# Patient Record
Sex: FEMALE | Race: WHITE | NOT HISPANIC OR LATINO
[De-identification: names, ages, dates, MRNs, and addresses within clinical notes are randomized per-mention and may not be internally consistent; named-entity substitution may affect disease eponyms.]

---

## 2017-07-08 ENCOUNTER — RX RENEWAL (OUTPATIENT)
Age: 57
End: 2017-07-08

## 2017-09-22 ENCOUNTER — APPOINTMENT (OUTPATIENT)
Dept: FAMILY MEDICINE | Facility: CLINIC | Age: 57
End: 2017-09-22
Payer: COMMERCIAL

## 2017-09-22 VITALS
WEIGHT: 179 LBS | HEIGHT: 66 IN | SYSTOLIC BLOOD PRESSURE: 128 MMHG | HEART RATE: 91 BPM | TEMPERATURE: 98.9 F | BODY MASS INDEX: 28.77 KG/M2 | OXYGEN SATURATION: 98 % | RESPIRATION RATE: 12 BRPM | DIASTOLIC BLOOD PRESSURE: 72 MMHG

## 2017-09-22 PROCEDURE — 99214 OFFICE O/P EST MOD 30 MIN: CPT

## 2018-04-02 ENCOUNTER — RX RENEWAL (OUTPATIENT)
Age: 58
End: 2018-04-02

## 2018-04-26 ENCOUNTER — APPOINTMENT (OUTPATIENT)
Dept: FAMILY MEDICINE | Facility: CLINIC | Age: 58
End: 2018-04-26
Payer: COMMERCIAL

## 2018-04-26 VITALS
HEART RATE: 88 BPM | DIASTOLIC BLOOD PRESSURE: 70 MMHG | RESPIRATION RATE: 12 BRPM | SYSTOLIC BLOOD PRESSURE: 120 MMHG | TEMPERATURE: 98.9 F | OXYGEN SATURATION: 98 % | HEIGHT: 66 IN | WEIGHT: 184 LBS | BODY MASS INDEX: 29.57 KG/M2

## 2018-04-26 PROCEDURE — 99214 OFFICE O/P EST MOD 30 MIN: CPT

## 2018-06-19 ENCOUNTER — APPOINTMENT (OUTPATIENT)
Dept: FAMILY MEDICINE | Facility: CLINIC | Age: 58
End: 2018-06-19
Payer: COMMERCIAL

## 2018-06-19 VITALS
HEIGHT: 66 IN | DIASTOLIC BLOOD PRESSURE: 70 MMHG | BODY MASS INDEX: 29.89 KG/M2 | TEMPERATURE: 98.4 F | OXYGEN SATURATION: 98 % | WEIGHT: 186 LBS | SYSTOLIC BLOOD PRESSURE: 116 MMHG | RESPIRATION RATE: 13 BRPM | HEART RATE: 110 BPM

## 2018-06-19 PROCEDURE — 99214 OFFICE O/P EST MOD 30 MIN: CPT

## 2018-10-02 ENCOUNTER — APPOINTMENT (OUTPATIENT)
Dept: FAMILY MEDICINE | Facility: CLINIC | Age: 58
End: 2018-10-02
Payer: COMMERCIAL

## 2018-10-02 VITALS
TEMPERATURE: 99 F | WEIGHT: 180 LBS | RESPIRATION RATE: 13 BRPM | HEIGHT: 66 IN | OXYGEN SATURATION: 98 % | BODY MASS INDEX: 28.93 KG/M2 | DIASTOLIC BLOOD PRESSURE: 70 MMHG | HEART RATE: 94 BPM | SYSTOLIC BLOOD PRESSURE: 126 MMHG

## 2018-10-02 PROCEDURE — 99214 OFFICE O/P EST MOD 30 MIN: CPT

## 2018-10-02 NOTE — ASSESSMENT
[FreeTextEntry1] : Patient will start Cefzil 500 mg p.o. b.i.d. after food x10 days.\par Continue with Flonase AQ.\par Continue with saline nasal drops.\par Hot steam inhalations 3-4 times a day.\par Tylenol/Advil for headache.\par \par Followup 2 weeks for flu shot.

## 2018-10-02 NOTE — HISTORY OF PRESENT ILLNESS
[FreeTextEntry8] : 58-year-old white female here for an acute visit.\par Patient states she developed a cold last week which has since progressed to sinus congestion with bad headaches, productive cough with brownish phlegm. Patient denies any fever, GI symptoms or urinary symptoms.

## 2018-10-02 NOTE — REVIEW OF SYSTEMS
[Fever] : no fever [Chills] : no chills [Fatigue] : no fatigue [Discharge] : no discharge [Vision Problems] : no vision problems [Earache] : no earache [Nasal Discharge] : nasal discharge [Sore Throat] : no sore throat [Postnasal Drip] : postnasal drip [Chest Pain] : no chest pain [Palpitations] : no palpitations [Shortness Of Breath] : no shortness of breath [Wheezing] : no wheezing [Cough] : cough [Abdominal Pain] : no abdominal pain [Nausea] : no nausea [Diarrhea] : diarrhea [Vomiting] : no vomiting [Dysuria] : no dysuria [Hematuria] : no hematuria [Frequency] : no frequency [Joint Pain] : no joint pain [Muscle Pain] : no muscle pain [Headache] : headache [Dizziness] : no dizziness [Insomnia] : no insomnia [Anxiety] : no anxiety [Depression] : no depression [Swollen Glands] : no swollen glands [FreeTextEntry4] : sinus congestion

## 2018-10-02 NOTE — HEALTH RISK ASSESSMENT
[] : No [No falls in past year] : Patient reported no falls in the past year [0] : 2) Feeling down, depressed, or hopeless: Not at all (0) [[id number]] : 0

## 2018-10-02 NOTE — PHYSICAL EXAM
[No Acute Distress] : no acute distress [Well Nourished] : well nourished [Well Developed] : well developed [Well-Appearing] : well-appearing [Normal Sclera/Conjunctiva] : normal sclera/conjunctiva [PERRL] : pupils equal round and reactive to light [EOMI] : extraocular movements intact [Normal Outer Ear/Nose] : the outer ears and nose were normal in appearance [Normal Oropharynx] : the oropharynx was normal [Normal TMs] : both tympanic membranes were normal [No JVD] : no jugular venous distention [Supple] : supple [No Lymphadenopathy] : no lymphadenopathy [Thyroid Normal, No Nodules] : the thyroid was normal and there were no nodules present [No Respiratory Distress] : no respiratory distress  [Clear to Auscultation] : lungs were clear to auscultation bilaterally [No Accessory Muscle Use] : no accessory muscle use [Normal Rate] : normal rate  [Regular Rhythm] : with a regular rhythm [Normal S1, S2] : normal S1 and S2 [No Murmur] : no murmur heard [No Varicosities] : no varicosities [Pedal Pulses Present] : the pedal pulses are present [No Edema] : there was no peripheral edema [No Extremity Clubbing/Cyanosis] : no extremity clubbing/cyanosis [Soft] : abdomen soft [Non Tender] : non-tender [Non-distended] : non-distended [No Masses] : no abdominal mass palpated [No HSM] : no HSM [Normal Bowel Sounds] : normal bowel sounds [Normal Posterior Cervical Nodes] : no posterior cervical lymphadenopathy [Normal Anterior Cervical Nodes] : no anterior cervical lymphadenopathy [No CVA Tenderness] : no CVA  tenderness [No Spinal Tenderness] : no spinal tenderness [No Joint Swelling] : no joint swelling [Grossly Normal Strength/Tone] : grossly normal strength/tone [No Rash] : no rash [Normal Gait] : normal gait [Coordination Grossly Intact] : coordination grossly intact [No Focal Deficits] : no focal deficits [Deep Tendon Reflexes (DTR)] : deep tendon reflexes were 2+ and symmetric [Normal Affect] : the affect was normal [Normal Insight/Judgement] : insight and judgment were intact [de-identified] : tender frontal, ethmoid and maxillary sinuses bilaterally

## 2018-10-04 ENCOUNTER — RX RENEWAL (OUTPATIENT)
Age: 58
End: 2018-10-04

## 2018-10-25 ENCOUNTER — APPOINTMENT (OUTPATIENT)
Dept: ENDOCRINOLOGY | Facility: CLINIC | Age: 58
End: 2018-10-25

## 2018-11-06 LAB
HBA1C MFR BLD HPLC: 6
HBA1C MFR BLD: 6

## 2018-11-13 LAB
HBA1C MFR BLD HPLC: 6.2
HBA1C MFR BLD: 6.2
HBA1C MFR BLD: 6.2

## 2018-11-14 ENCOUNTER — RECORD ABSTRACTING (OUTPATIENT)
Age: 58
End: 2018-11-14

## 2018-12-12 ENCOUNTER — RECORD ABSTRACTING (OUTPATIENT)
Age: 58
End: 2018-12-12

## 2018-12-26 ENCOUNTER — RX RENEWAL (OUTPATIENT)
Age: 58
End: 2018-12-26

## 2019-01-10 ENCOUNTER — APPOINTMENT (OUTPATIENT)
Dept: ENDOCRINOLOGY | Facility: CLINIC | Age: 59
End: 2019-01-10
Payer: COMMERCIAL

## 2019-01-10 ENCOUNTER — RESULT CHARGE (OUTPATIENT)
Age: 59
End: 2019-01-10

## 2019-01-10 VITALS
HEART RATE: 99 BPM | HEIGHT: 66 IN | BODY MASS INDEX: 29.89 KG/M2 | DIASTOLIC BLOOD PRESSURE: 80 MMHG | SYSTOLIC BLOOD PRESSURE: 120 MMHG | WEIGHT: 186 LBS

## 2019-01-10 LAB — GLUCOSE BLDC GLUCOMTR-MCNC: 199

## 2019-01-10 PROCEDURE — 99214 OFFICE O/P EST MOD 30 MIN: CPT | Mod: 25

## 2019-01-10 PROCEDURE — 82962 GLUCOSE BLOOD TEST: CPT

## 2019-01-10 RX ORDER — CLARITHROMYCIN 500 MG/1
500 TABLET, FILM COATED ORAL TWICE DAILY
Qty: 14 | Refills: 0 | Status: DISCONTINUED | COMMUNITY
Start: 2017-09-22 | End: 2019-01-10

## 2019-01-10 RX ORDER — CLARITHROMYCIN 500 MG/1
500 TABLET, FILM COATED ORAL TWICE DAILY
Qty: 14 | Refills: 0 | Status: DISCONTINUED | COMMUNITY
Start: 2018-04-26 | End: 2019-01-10

## 2019-01-10 RX ORDER — CEFPROZIL 500 MG/1
500 TABLET ORAL
Qty: 20 | Refills: 0 | Status: DISCONTINUED | COMMUNITY
Start: 2018-10-02 | End: 2019-01-10

## 2019-01-18 ENCOUNTER — RX RENEWAL (OUTPATIENT)
Age: 59
End: 2019-01-18

## 2019-01-18 RX ORDER — LINAGLIPTIN 5 MG/1
5 TABLET, FILM COATED ORAL DAILY
Refills: 0 | Status: DISCONTINUED | COMMUNITY
Start: 1900-01-01 | End: 2019-01-18

## 2019-01-21 ENCOUNTER — RX RENEWAL (OUTPATIENT)
Age: 59
End: 2019-01-21

## 2019-02-07 ENCOUNTER — RX RENEWAL (OUTPATIENT)
Age: 59
End: 2019-02-07

## 2019-03-04 ENCOUNTER — APPOINTMENT (OUTPATIENT)
Dept: FAMILY MEDICINE | Facility: CLINIC | Age: 59
End: 2019-03-04
Payer: COMMERCIAL

## 2019-03-04 VITALS
BODY MASS INDEX: 29.89 KG/M2 | WEIGHT: 186 LBS | RESPIRATION RATE: 13 BRPM | TEMPERATURE: 98 F | HEIGHT: 66 IN | DIASTOLIC BLOOD PRESSURE: 78 MMHG | HEART RATE: 78 BPM | OXYGEN SATURATION: 98 % | SYSTOLIC BLOOD PRESSURE: 128 MMHG

## 2019-03-04 PROCEDURE — 99213 OFFICE O/P EST LOW 20 MIN: CPT

## 2019-03-04 NOTE — REVIEW OF SYSTEMS
[Fever] : no fever [Chills] : no chills [Fatigue] : no fatigue [Discharge] : no discharge [Vision Problems] : no vision problems [Earache] : no earache [Nasal Discharge] : no nasal discharge [Sore Throat] : no sore throat [Postnasal Drip] : no postnasal drip [Chest Pain] : no chest pain [Palpitations] : no palpitations [Shortness Of Breath] : no shortness of breath [Wheezing] : no wheezing [Cough] : no cough [Abdominal Pain] : no abdominal pain [Nausea] : no nausea [Diarrhea] : diarrhea [Vomiting] : no vomiting [Dysuria] : no dysuria [Hematuria] : no hematuria [Frequency] : no frequency [Joint Pain] : no joint pain [Muscle Pain] : no muscle pain [Headache] : no headache [Dizziness] : no dizziness [Insomnia] : no insomnia [Anxiety] : no anxiety [Depression] : no depression [Swollen Glands] : no swollen glands [FreeTextEntry9] : 2 inch horizontal indentation mid calf on both lower legs [de-identified] : dry skin both legs

## 2019-03-04 NOTE — PHYSICAL EXAM
[No Respiratory Distress] : no respiratory distress  [Clear to Auscultation] : lungs were clear to auscultation bilaterally [No Accessory Muscle Use] : no accessory muscle use [Normal Rate] : normal rate  [Regular Rhythm] : with a regular rhythm [Normal S1, S2] : normal S1 and S2 [No Varicosities] : no varicosities [Pedal Pulses Present] : the pedal pulses are present [No Edema] : there was no peripheral edema [No Joint Swelling] : no joint swelling [Grossly Normal Strength/Tone] : grossly normal strength/tone [Normal Gait] : normal gait [Coordination Grossly Intact] : coordination grossly intact [No Focal Deficits] : no focal deficits [Normal Affect] : the affect was normal [Normal Insight/Judgement] : insight and judgment were intact [de-identified] : dry skindermatitis on both lower legs. 2 inch indentationhorizontal on both mid calf. Nontender. No bruising or edema noted.

## 2019-03-04 NOTE — HISTORY OF PRESENT ILLNESS
[FreeTextEntry8] : 58-year-old white female here for an acute visit. Patient states she has noted in the middle of her lower leg horizontal 2 inch indentation bilaterally. Patient denies any history of trauma, denies wearing knee-highs or socks up to that level. No history of pain or ankle swelling. Patient does have dry skin on both legs.

## 2019-03-04 NOTE — ASSESSMENT
[FreeTextEntry1] : Dry skin dermatitis----patient to see dermatology for skin check. Apply moisturizing lotion.\par  Indentation of skin on both legs----up patient to see dermatology. No edema or pain elicited. If there is pain or swelling developing patient to return for ultrasound lower legs.\par

## 2019-03-04 NOTE — HEALTH RISK ASSESSMENT
[] : No [No falls in past year] : Patient reported no falls in the past year [0] : 2) Feeling down, depressed, or hopeless: Not at all (0) [de-identified] : Endo [[id number]] : 0

## 2019-03-26 ENCOUNTER — RX CHANGE (OUTPATIENT)
Age: 59
End: 2019-03-26

## 2019-05-23 ENCOUNTER — RESULT CHARGE (OUTPATIENT)
Age: 59
End: 2019-05-23

## 2019-05-23 ENCOUNTER — APPOINTMENT (OUTPATIENT)
Dept: ENDOCRINOLOGY | Facility: CLINIC | Age: 59
End: 2019-05-23
Payer: COMMERCIAL

## 2019-05-23 VITALS
DIASTOLIC BLOOD PRESSURE: 80 MMHG | HEART RATE: 88 BPM | HEIGHT: 66 IN | BODY MASS INDEX: 30.86 KG/M2 | SYSTOLIC BLOOD PRESSURE: 122 MMHG | WEIGHT: 192 LBS

## 2019-05-23 LAB — GLUCOSE BLDC GLUCOMTR-MCNC: 187

## 2019-05-23 PROCEDURE — 99214 OFFICE O/P EST MOD 30 MIN: CPT | Mod: 25

## 2019-05-23 PROCEDURE — 82962 GLUCOSE BLOOD TEST: CPT

## 2019-05-23 RX ORDER — OXYCODONE HYDROCHLORIDE 7.5 MG/1
7.5 TABLET ORAL
Refills: 0 | Status: DISCONTINUED | COMMUNITY
End: 2019-05-23

## 2019-06-19 ENCOUNTER — RX RENEWAL (OUTPATIENT)
Age: 59
End: 2019-06-19

## 2019-07-12 ENCOUNTER — RX RENEWAL (OUTPATIENT)
Age: 59
End: 2019-07-12

## 2019-07-15 ENCOUNTER — RX RENEWAL (OUTPATIENT)
Age: 59
End: 2019-07-15

## 2019-07-16 ENCOUNTER — RX RENEWAL (OUTPATIENT)
Age: 59
End: 2019-07-16

## 2019-09-11 ENCOUNTER — RX RENEWAL (OUTPATIENT)
Age: 59
End: 2019-09-11

## 2019-09-12 ENCOUNTER — APPOINTMENT (OUTPATIENT)
Dept: FAMILY MEDICINE | Facility: CLINIC | Age: 59
End: 2019-09-12
Payer: COMMERCIAL

## 2019-09-12 VITALS
DIASTOLIC BLOOD PRESSURE: 80 MMHG | RESPIRATION RATE: 12 BRPM | SYSTOLIC BLOOD PRESSURE: 120 MMHG | HEIGHT: 66 IN | HEART RATE: 100 BPM | WEIGHT: 192 LBS | TEMPERATURE: 98.2 F | OXYGEN SATURATION: 98 % | BODY MASS INDEX: 30.86 KG/M2

## 2019-09-12 PROCEDURE — 99214 OFFICE O/P EST MOD 30 MIN: CPT

## 2019-09-12 NOTE — HEALTH RISK ASSESSMENT
[] : No [No falls in past year] : Patient reported no falls in the past year [No] : In the past 12 months have you used drugs other than those required for medical reasons? No [0] : 2) Feeling down, depressed, or hopeless: Not at all (0) [Audit-CScore] : 0 [de-identified] : Endo [[id number]] : 0

## 2019-09-12 NOTE — PHYSICAL EXAM
[No Acute Distress] : no acute distress [Normal Sclera/Conjunctiva] : normal sclera/conjunctiva [PERRL] : pupils equal round and reactive to light [EOMI] : extraocular movements intact [Normal Outer Ear/Nose] : the outer ears and nose were normal in appearance [Normal Oropharynx] : the oropharynx was normal [Normal Nasal Mucosa] : the nasal mucosa was normal [Normal TMs] : both tympanic membranes were normal [Supple] : supple [No JVD] : no jugular venous distention [No Lymphadenopathy] : no lymphadenopathy [No Respiratory Distress] : no respiratory distress  [Clear to Auscultation] : lungs were clear to auscultation bilaterally [No Accessory Muscle Use] : no accessory muscle use [Normal Rate] : normal rate  [Regular Rhythm] : with a regular rhythm [Normal S1, S2] : normal S1 and S2 [No Varicosities] : no varicosities [No Edema] : there was no peripheral edema [Pedal Pulses Present] : the pedal pulses are present [Soft] : abdomen soft [Non Tender] : non-tender [Non-distended] : non-distended [No HSM] : no HSM [No Masses] : no abdominal mass palpated [Normal Supraclavicular Nodes] : no supraclavicular lymphadenopathy [Submandibular Lymph Nodes Enlarged On The Right] : enlarged node(s) on the right [Normal Posterior Cervical Nodes] : no posterior cervical lymphadenopathy [No Joint Swelling] : no joint swelling [No Spinal Tenderness] : no spinal tenderness [No CVA Tenderness] : no CVA  tenderness [No Rash] : no rash [Grossly Normal Strength/Tone] : grossly normal strength/tone [No Skin Lesions] : no skin lesions [Normal Gait] : normal gait [Coordination Grossly Intact] : coordination grossly intact [No Focal Deficits] : no focal deficits [Normal Affect] : the affect was normal [Normal Insight/Judgement] : insight and judgment were intact [de-identified] : acutely tender on palpation frontal ethmoid and maxillary sinuses bilaterally

## 2019-09-12 NOTE — ASSESSMENT
[FreeTextEntry1] : She will start Biaxin 500 mg one tablet b.i.d. after food x10 days.\par Continue with Flonase AQ one spray b.i.d..\par Continue with Zyrtec 10 mg once daily at that time.\par Saline nasal rinses.\par Hot steam inhalations.\par Warm saline gargles.\par \par Flu shot when patient gets better.

## 2019-09-12 NOTE — REVIEW OF SYSTEMS
[Earache] : no earache [Sore Throat] : sore throat [Nasal Discharge] : nasal discharge [Postnasal Drip] : postnasal drip [Cough] : cough [FreeTextEntry4] : sinus congestion [Negative] : Heme/Lymph

## 2019-09-12 NOTE — HISTORY OF PRESENT ILLNESS
[Cold Symptoms] : cold symptoms [Moderate] : moderate [___ Weeks ago] :  [unfilled] weeks ago [Congestion] : congestion [Constant] : constant [Sore Throat] : sore throat [Cough] : cough [Wheezing] : no wheezing [Chills] : no chills [Shortness Of Breath] : no shortness of breath [Anorexia] : no anorexia [Earache] : no earache [Fatigue] : not fatigue [Headache] : headache [OTC Remedies] : OTC remedies [Worsening] : worsening [Fever] : no fever

## 2019-10-07 ENCOUNTER — RX RENEWAL (OUTPATIENT)
Age: 59
End: 2019-10-07

## 2019-10-25 ENCOUNTER — MEDICATION RENEWAL (OUTPATIENT)
Age: 59
End: 2019-10-25

## 2019-12-10 LAB
HBA1C MFR BLD HPLC: 6.5
LDLC SERPL DIRECT ASSAY-MCNC: 50

## 2019-12-11 LAB — LDLC SERPL DIRECT ASSAY-MCNC: 56

## 2019-12-12 ENCOUNTER — RESULT CHARGE (OUTPATIENT)
Age: 59
End: 2019-12-12

## 2019-12-12 ENCOUNTER — APPOINTMENT (OUTPATIENT)
Dept: ENDOCRINOLOGY | Facility: CLINIC | Age: 59
End: 2019-12-12
Payer: COMMERCIAL

## 2019-12-12 VITALS
BODY MASS INDEX: 31.34 KG/M2 | HEIGHT: 66 IN | HEART RATE: 96 BPM | WEIGHT: 195 LBS | SYSTOLIC BLOOD PRESSURE: 126 MMHG | DIASTOLIC BLOOD PRESSURE: 90 MMHG

## 2019-12-12 LAB
GLUCOSE BLDC GLUCOMTR-MCNC: 206
MICROALBUMIN/CREAT 24H UR-RTO: 3

## 2019-12-12 PROCEDURE — 82962 GLUCOSE BLOOD TEST: CPT

## 2019-12-12 PROCEDURE — 99214 OFFICE O/P EST MOD 30 MIN: CPT | Mod: 25

## 2019-12-12 RX ORDER — HYDROCODONE BITARTRATE 30 MG/1
30 TABLET, EXTENDED RELEASE ORAL DAILY
Refills: 0 | Status: DISCONTINUED | COMMUNITY
End: 2019-12-12

## 2019-12-12 RX ORDER — SUCRALFATE 1 G/1
1 TABLET ORAL 4 TIMES DAILY
Refills: 0 | Status: DISCONTINUED | COMMUNITY
End: 2019-12-12

## 2019-12-12 RX ORDER — CLARITHROMYCIN 500 MG/1
500 TABLET, FILM COATED ORAL TWICE DAILY
Qty: 20 | Refills: 0 | Status: DISCONTINUED | COMMUNITY
Start: 2019-09-12 | End: 2019-12-12

## 2019-12-12 NOTE — ASSESSMENT
[FreeTextEntry1] : 1. T2DM without comp - worsening control\par - cont Januvia\par - increase  mg 2 tabs BID\par - repeat A1c 3 months\par - increased SMBG\par - restart diabetic diet\par - eye exam with ophthalmology\par \par 2. stable, asymptomatic multiple thyroid nodules benign FNA in past - repeat thyroid sono due 5/2020\par \par 3. HLD - stable, cont statin\par

## 2019-12-12 NOTE — REVIEW OF SYSTEMS
[Recent Weight Gain (___ Lbs)] : recent [unfilled] ~Ulb weight gain [As Noted in HPI] : as noted in HPI [Pain/Numbness of Digits] : pain/numbness of digits [Fatigue] : fatigue [Polydipsia] : polydipsia [Visual Field Defect] : no visual field defect [Blurry Vision] : no blurred vision [Chest Pain] : no chest pain [Palpitations] : no palpitations [Shortness Of Breath] : no shortness of breath [SOB on Exertion] : no shortness of breath during exertion [Nausea] : no nausea [Vomiting] : no vomiting was observed [Constipation] : no constipation [Diarrhea] : no diarrhea [Abdominal Pain] : no abdominal pain [Polyuria] : no polyuria [Nocturia] : no nocturia [Depression] : no depression [Anxiety] : no anxiety

## 2019-12-12 NOTE — REASON FOR VISIT
[Follow-Up: _____] : a [unfilled] follow-up visit [FreeTextEntry1] : worsening T2DM, stable thyroid nodules, stable hyperlipidemia.

## 2019-12-12 NOTE — PHYSICAL EXAM
[Alert] : alert [Well Nourished] : well nourished [Well Developed] : well developed [Normal Sclera/Conjunctiva] : normal sclera/conjunctiva [EOMI] : extra ocular movement intact [No LAD] : no lymphadenopathy [Normal Rate and Effort] : normal respiratory rhythm and effort [Clear to Auscultation] : lungs were clear to auscultation bilaterally [Normal Rate] : heart rate was normal  [Normal S1, S2] : normal S1 and S2 [Normal Bowel Sounds] : normal bowel sounds [Not Tender] : non-tender [Soft] : abdomen soft [Normal Gait] : normal gait [No Clubbing, Cyanosis] : no clubbing  or cyanosis of the fingernails [Cranial Nerves Intact] : cranial nerves 2-12 were intact [Normal Reflexes] : deep tendon reflexes were 2+ and symmetric [Oriented x3] : oriented to person, place, and time [Normal Insight/Judgement] : insight and judgment were intact [Normal Affect] : the affect was normal [Normal Mood] : the mood was normal [No Rash] : no rash [Right Foot Was Examined] : right foot ~C was examined [Left Foot Was Examined] : left foot ~C was examined [2+] : 2+ in the dorsalis pedis [Normal] : normal [Foot Ulcers] : no foot ulcers [Vibration Dec.] : normal vibratory sensation at the level of the toes [Position Sense Dec.] : normal position sense at the level of the toes [Diminished Throughout Both Feet] : normal tactile sensation with monofilament testing throughout both feet [de-identified] : Left thyroid nodules

## 2019-12-12 NOTE — HISTORY OF PRESENT ILLNESS
[FreeTextEntry1] : 1. T2DM dx 2009 on routine blood test. Controlled -reports higher numbers lately, worsening due to diet\par Current DM meds: \par MFN  mg 2 tabs daily\par Januvia 100 mg daily\par Farxiga caused yeast infections\par \par SMBG: meter reviewed. premeals, not testing regularly\par 10/25 201\par 11/14 185\par 12/1 204\par 12/9 104\par 12/10 178\par 12/12 179\par \par Diet: nonadherent\par Exercise: not regularly\par \par Complications:\par 9/2/19eye exam no DR\par (+) neuropathy ?diabetes ?radiculopathy - following with neuro\par 12/2019 neg urine microalb/cr\par \par 2. Thyroid nodules - no thyroid meds, denies anterior neck pain, dysphagia or chronic voice changes.\par 3. Hyperlipidemia - on rosuvastatin\par

## 2019-12-12 NOTE — DATA REVIEWED
[FreeTextEntry1] : Thyroid FNA 2017 LMP and LLP nodules - benign follicular nodules\par \par Thyroid sono 5/2018 enlarged thyroid with bilateral thyroid nodules -  minimal changes\par \par Thyroid sono 5/9/19 normal thyroid size, mildly heterogenous, multiple thyroid nodules stable/decreased in size\par RLP nodule 9x8x5 mm\par RMP nodule 8x4x4 mm\par LMP nodule 10x9x9 mm\par LMP nodule 8x6x5 mm\par LMP nodule [id number] mm\par \par Labs 1/2019\par TSH 0.73\par A1c 6.6%\par urine microalb/Cr 5\par \par Labs 5/16/19\par LDL 50, HDL 67, Tg 150\par A1c 6.5%\par \par Labs 12/10/19\par Gluc 168, A1c 7%\par Cr 0.70, GFR 94\par urine microalb/Cr 3\par LDL 56, HDl 81, tg 132\par

## 2019-12-23 ENCOUNTER — APPOINTMENT (OUTPATIENT)
Dept: FAMILY MEDICINE | Facility: CLINIC | Age: 59
End: 2019-12-23
Payer: COMMERCIAL

## 2019-12-23 VITALS
HEIGHT: 66 IN | DIASTOLIC BLOOD PRESSURE: 80 MMHG | HEART RATE: 78 BPM | SYSTOLIC BLOOD PRESSURE: 124 MMHG | TEMPERATURE: 98.2 F | OXYGEN SATURATION: 98 % | RESPIRATION RATE: 13 BRPM | WEIGHT: 195 LBS | BODY MASS INDEX: 31.34 KG/M2

## 2019-12-23 DIAGNOSIS — J01.11 ACUTE RECURRENT FRONTAL SINUSITIS: ICD-10-CM

## 2019-12-23 DIAGNOSIS — Z87.09 PERSONAL HISTORY OF OTHER DISEASES OF THE RESPIRATORY SYSTEM: ICD-10-CM

## 2019-12-23 DIAGNOSIS — J01.20 ACUTE ETHMOIDAL SINUSITIS, UNSPECIFIED: ICD-10-CM

## 2019-12-23 DIAGNOSIS — J01.00 ACUTE MAXILLARY SINUSITIS, UNSPECIFIED: ICD-10-CM

## 2019-12-23 PROCEDURE — 99214 OFFICE O/P EST MOD 30 MIN: CPT

## 2019-12-31 ENCOUNTER — RX RENEWAL (OUTPATIENT)
Age: 59
End: 2019-12-31

## 2020-03-02 ENCOUNTER — RX RENEWAL (OUTPATIENT)
Age: 60
End: 2020-03-02

## 2020-03-26 ENCOUNTER — APPOINTMENT (OUTPATIENT)
Dept: ENDOCRINOLOGY | Facility: CLINIC | Age: 60
End: 2020-03-26
Payer: COMMERCIAL

## 2020-03-26 PROCEDURE — G2012 BRIEF CHECK IN BY MD/QHP: CPT

## 2020-03-26 RX ORDER — PROMETHAZINE HYDROCHLORIDE AND DEXTROMETHORPHAN HYDROBROMIDE ORAL SOLUTION 15; 6.25 MG/5ML; MG/5ML
6.25-15 SOLUTION ORAL
Qty: 240 | Refills: 1 | Status: DISCONTINUED | COMMUNITY
Start: 2019-12-23 | End: 2020-03-26

## 2020-03-26 RX ORDER — CLARITHROMYCIN 500 MG/1
500 TABLET, FILM COATED ORAL TWICE DAILY
Qty: 14 | Refills: 0 | Status: DISCONTINUED | COMMUNITY
Start: 2019-12-23 | End: 2020-03-26

## 2020-06-03 LAB
HBA1C MFR BLD HPLC: 7
LDLC SERPL DIRECT ASSAY-MCNC: 64
MICROALBUMIN/CREAT 24H UR-RTO: 8

## 2020-06-04 ENCOUNTER — APPOINTMENT (OUTPATIENT)
Dept: ENDOCRINOLOGY | Facility: CLINIC | Age: 60
End: 2020-06-04
Payer: COMMERCIAL

## 2020-06-04 ENCOUNTER — RESULT CHARGE (OUTPATIENT)
Age: 60
End: 2020-06-04

## 2020-06-04 VITALS
OXYGEN SATURATION: 98 % | WEIGHT: 180 LBS | BODY MASS INDEX: 28.93 KG/M2 | HEIGHT: 66 IN | SYSTOLIC BLOOD PRESSURE: 120 MMHG | DIASTOLIC BLOOD PRESSURE: 80 MMHG | HEART RATE: 96 BPM

## 2020-06-04 LAB — GLUCOSE BLDC GLUCOMTR-MCNC: 254

## 2020-06-04 PROCEDURE — 99214 OFFICE O/P EST MOD 30 MIN: CPT | Mod: 25

## 2020-06-04 PROCEDURE — 82962 GLUCOSE BLOOD TEST: CPT

## 2020-06-04 RX ORDER — GLIPIZIDE 5 MG/1
5 TABLET ORAL DAILY
Qty: 90 | Refills: 1 | Status: DISCONTINUED | COMMUNITY
Start: 2020-03-26 | End: 2020-06-04

## 2020-06-04 NOTE — ASSESSMENT
[FreeTextEntry1] : 1. T2DM without comp - worsening control by history, A1c stable\par - cont Januvia\par - cont MFN 1000 mg daily, take 3 tabs daily\par - stop Glipizide due to lows\par - repeat A1c 3 months\par - try and watch diet better\par - call office with highs/lows\par \par 2. stable, asymptomatic multiple thyroid nodules benign FNA in past - repeat thyroid sono due\par \par 3. HLD - cont statin

## 2020-06-04 NOTE — DATA REVIEWED
[FreeTextEntry1] : Thyroid FNA 2017 LMP and LLP nodules - benign follicular nodules\par \par Thyroid sono 5/2018 enlarged thyroid with bilateral thyroid nodules -  minimal changes\par \par Thyroid sono 5/9/19 normal thyroid size, mildly heterogenous, multiple thyroid nodules stable/decreased in size\par RLP nodule 9x8x5 mm\par RMP nodule 8x4x4 mm\par LMP nodule 10x9x9 mm\par LMP nodule 8x6x5 mm\par LMP nodule [id number] mm\par \par Labs 1/2019\par TSH 0.73\par A1c 6.6%\par urine microalb/Cr 5\par \par Labs 5/16/19\par LDL 50, HDL 67, Tg 150\par A1c 6.5%\par \par Labs 12/10/19\par Gluc 168, A1c 7%\par Cr 0.70, GFR 94\par urine microalb/Cr 3\par LDL 56, HDl 81, tg 132\par \par Labs 6/1/20 \par Gluc 160, A1c 7%\par Cr 0.72, GFR 91\par urine microalb/Cr 8\par LDL 64, HDL 73, Tg 148\par TSH 0.86\par B12 777\par

## 2020-06-04 NOTE — REVIEW OF SYSTEMS
[Nocturia] : nocturia [Fatigue] : no fatigue [Recent Weight Gain (___ Lbs)] : no recent weight gain [Recent Weight Loss (___ Lbs)] : no recent weight loss [Chest Pain] : no chest pain [Blurred Vision] : no blurred vision [Shortness Of Breath] : no shortness of breath [Palpitations] : no palpitations [SOB on Exertion] : no shortness of breath on exertion [Nausea] : no nausea [Abdominal Pain] : no abdominal pain [Diarrhea] : no diarrhea [Vomiting] : no vomiting [Polyuria] : no polyuria [Pain/Numbness of Digits] : no pain/numbness of digits [Anxiety] : no anxiety [Depression] : no depression [Polydipsia] : no polydipsia

## 2020-06-04 NOTE — HISTORY OF PRESENT ILLNESS
[FreeTextEntry1] : Interval Hx: hypoglycemia with Glipizide 5 mg daily, she stopped taking\par \par 1. T2DM dx 2009 on routine blood test. Controlled -reports higher numbers lately, worsening due to diet\par Past meds: Farxiga caused yeast infections\par \par Current DM meds: \par MFN  mg 2 tabs daily, cannot tolerate 2 tabs BID\par Januvia 100 mg daily\par \par SMBG: meter reviewed. \par \par \par \par Diet: nonadherent\par Exercise: not regularly\par \par Complications:\par 9/2/19eye exam no DR\par (+) neuropathy ?diabetes ?radiculopathy - following with neuro\par 6/2020 neg urine microalb/cr\par \par 2. Thyroid nodules - no thyroid meds, denies anterior neck pain, dysphagia or chronic voice changes.\par 3. Hyperlipidemia - on rosuvastatin\par 
Declined

## 2020-06-04 NOTE — PHYSICAL EXAM
[Alert] : alert [Well Nourished] : well nourished [Healthy Appearance] : healthy appearance [Well Developed] : well developed [Normal Sclera/Conjunctiva] : normal sclera/conjunctiva [EOMI] : extra ocular movement intact [No Respiratory Distress] : no respiratory distress [No Accessory Muscle Use] : no accessory muscle use [Clear to Auscultation] : lungs were clear to auscultation bilaterally [Normal S1, S2] : normal S1 and S2 [Normal Rate] : heart rate was normal [No Edema] : no peripheral edema [Normal Bowel Sounds] : normal bowel sounds [Not Tender] : non-tender [Not Distended] : not distended [Soft] : abdomen soft [Cranial Nerves Intact] : cranial nerves 2-12 were intact [Oriented x3] : oriented to person, place, and time [Normal Insight/Judgement] : insight and judgment were intact [Normal Affect] : the affect was normal [Normal Mood] : the mood was normal

## 2020-09-28 ENCOUNTER — APPOINTMENT (OUTPATIENT)
Dept: FAMILY MEDICINE | Facility: CLINIC | Age: 60
End: 2020-09-28
Payer: COMMERCIAL

## 2020-09-28 VITALS
HEART RATE: 92 BPM | BODY MASS INDEX: 31.02 KG/M2 | SYSTOLIC BLOOD PRESSURE: 122 MMHG | TEMPERATURE: 98.2 F | DIASTOLIC BLOOD PRESSURE: 80 MMHG | WEIGHT: 193 LBS | OXYGEN SATURATION: 98 % | RESPIRATION RATE: 14 BRPM | HEIGHT: 66 IN

## 2020-09-28 DIAGNOSIS — Z78.9 OTHER SPECIFIED HEALTH STATUS: ICD-10-CM

## 2020-09-28 DIAGNOSIS — Z82.49 FAMILY HISTORY OF ISCHEMIC HEART DISEASE AND OTHER DISEASES OF THE CIRCULATORY SYSTEM: ICD-10-CM

## 2020-09-28 DIAGNOSIS — Z83.3 FAMILY HISTORY OF DIABETES MELLITUS: ICD-10-CM

## 2020-09-28 DIAGNOSIS — J06.9 ACUTE UPPER RESPIRATORY INFECTION, UNSPECIFIED: ICD-10-CM

## 2020-09-28 DIAGNOSIS — Z87.891 PERSONAL HISTORY OF NICOTINE DEPENDENCE: ICD-10-CM

## 2020-09-28 DIAGNOSIS — Z80.1 FAMILY HISTORY OF MALIGNANT NEOPLASM OF TRACHEA, BRONCHUS AND LUNG: ICD-10-CM

## 2020-09-28 PROCEDURE — 99214 OFFICE O/P EST MOD 30 MIN: CPT

## 2020-09-28 NOTE — HISTORY OF PRESENT ILLNESS
[FreeTextEntry1] : C/O Sinus Congestion and Pressure x 3 Days. [de-identified] : C/O Sinus Congestion and Pressure x 3 Days.

## 2020-09-28 NOTE — HEALTH RISK ASSESSMENT
[Yes] : Yes [Monthly or less (1 pt)] : Monthly or less (1 point) [1 or 2 (0 pts)] : 1 or 2 (0 points) [Never (0 pts)] : Never (0 points) [No] : In the past 12 months have you used drugs other than those required for medical reasons? No [No falls in past year] : Patient reported no falls in the past year [0] : 2) Feeling down, depressed, or hopeless: Not at all (0) [] : No [YearQuit] : 2008 [Audit-CScore] : 1 [de-identified] : Average [de-identified] : Average

## 2020-09-28 NOTE — REVIEW OF SYSTEMS
[Patient Intake Form Reviewed] : Patient intake form was reviewed [Negative] : Heme/Lymph [FreeTextEntry2] : Overweight [FreeTextEntry5] : DEE [de-identified] : Dry Skin

## 2020-09-28 NOTE — COUNSELING
[Fall prevention counseling provided] : Fall prevention counseling provided [Adequate lighting] : Adequate lighting [No throw rugs] : No throw rugs [Use proper foot wear] : Use proper foot wear [Behavioral health counseling provided] : Behavioral health counseling provided [Sleep ___ hours/day] : Sleep [unfilled] hours/day [Engage in a relaxing activity] : Engage in a relaxing activity [Plan in advance] : Plan in advance [AUDIT-C Screening administered and reviewed] : AUDIT-C Screening administered and reviewed [Potential consequences of obesity discussed] : Potential consequences of obesity discussed [Benefits of weight loss discussed] : Benefits of weight loss discussed [Encouraged to increase physical activity] : Encouraged to increase physical activity [Weigh Self Weekly] : weigh self weekly [Decrease Portions] : decrease portions [None] : None [Good understanding] : Patient has a good understanding of lifestyle changes and steps needed to achieve self management goal [FreeTextEntry2] : Quit Smoking

## 2020-10-28 LAB
HBA1C MFR BLD HPLC: 7.2
LDLC SERPL DIRECT ASSAY-MCNC: 62

## 2020-10-29 ENCOUNTER — RX RENEWAL (OUTPATIENT)
Age: 60
End: 2020-10-29

## 2020-10-29 ENCOUNTER — APPOINTMENT (OUTPATIENT)
Dept: ENDOCRINOLOGY | Facility: CLINIC | Age: 60
End: 2020-10-29
Payer: COMMERCIAL

## 2020-10-29 VITALS
SYSTOLIC BLOOD PRESSURE: 110 MMHG | WEIGHT: 190 LBS | HEART RATE: 95 BPM | OXYGEN SATURATION: 98 % | BODY MASS INDEX: 30.53 KG/M2 | HEIGHT: 66 IN | DIASTOLIC BLOOD PRESSURE: 82 MMHG

## 2020-10-29 LAB — GLUCOSE BLDC GLUCOMTR-MCNC: 235

## 2020-10-29 PROCEDURE — 99214 OFFICE O/P EST MOD 30 MIN: CPT | Mod: 25

## 2020-10-29 PROCEDURE — 82962 GLUCOSE BLOOD TEST: CPT

## 2020-10-29 PROCEDURE — 99072 ADDL SUPL MATRL&STAF TM PHE: CPT

## 2020-10-29 NOTE — DATA REVIEWED
[FreeTextEntry1] : Thyroid FNA 2017 LMP and LLP nodules - benign follicular nodules\par \par Thyroid sono 5/2018 enlarged thyroid with bilateral thyroid nodules -  minimal changes\par \par Thyroid sono 5/9/19 normal thyroid size, mildly heterogenous, multiple thyroid nodules stable/decreased in size\par RLP nodule 9x8x5 mm\par RMP nodule 8x4x4 mm\par LMP nodule 10x9x9 mm\par LMP nodule 8x6x5 mm\par LMP nodule 11l49p9 mm\par \par Thyroid sono 6/17/29\par RMp nodule 7x3x5 mm - hypoechoic, stable\par RLP nodule 10x6x9 mm - heterogeneous, stable\par LMP nodule 10x7x8 mm heterogeneous, stable, benign FNA in 2017\par LLP nodule [id number] mm heterogeneous nodule, w internal vascularity, stable. Benign FNA in 2017\par LMLP nodule 6x4x7 mm - hypoechoic, stable\par \par Labs 1/2019\par TSH 0.73\par A1c 6.6%\par urine microalb/Cr 5\par \par Labs 5/16/19\par LDL 50, HDL 67, Tg 150\par A1c 6.5%\par \par Labs 12/10/19\par Gluc 168, A1c 7%\par Cr 0.70, GFR 94\par urine microalb/Cr 3\par LDL 56, HDl 81, tg 132\par \par Labs 6/1/20 \par Gluc 160, A1c 7%\par Cr 0.72, GFR 91\par urine microalb/Cr 8\par LDL 64, HDL 73, Tg 148\par TSH 0.86\par B12 777\par \par Labs 10/26/20\par LDL 62, HDL 73, Tg 164\par Gluc 153, A1c 7.2\par Cr 0.72, GFR 90\par

## 2020-10-29 NOTE — ASSESSMENT
[FreeTextEntry1] : 1. T2DM without comp - slight loss of control\par - cont Januvia\par - cont MFN 1000 mg daily, try and take 3 tabs daily\par - needs to watch diet better\par - repeat A1c 3 months\par -restart testing FS 1x daily call office with highs/lows\par \par 2. stable, asymptomatic multiple thyroid nodules, benign FNA in past - repeat thyroid sono 2021\par \par 3. HLD - cont statin

## 2020-10-29 NOTE — HISTORY OF PRESENT ILLNESS
[FreeTextEntry1] : Interval Hx: no issues, no changes\par \par 1. T2DM dx 2009 on routine blood test. Controlled -reports higher numbers lately, worsening due to diet\par Past meds: Farxiga caused yeast infections\par \par Current DM meds: \par MFN  mg 2 tabs daily, cannot tolerate higher doses\par Januvia 100 mg daily\par \par SMBG: not testing\par \par Diet: "not good" "stress eating", halloween candy\par Exercise: not regularly\par \par Complications:\par 9/2/19 eye exam no DR\par (+) neuropathy ?diabetes ?radiculopathy - following with neuro, on gabapentin\par 6/2020 neg urine microalb/cr\par \par 2. Thyroid nodules - no thyroid meds, denies anterior neck pain, dysphagia or chronic voice changes.\par 3. Hyperlipidemia - on rosuvastatin\par

## 2020-10-29 NOTE — REVIEW OF SYSTEMS
[Nocturia] : nocturia [Fatigue] : no fatigue [Recent Weight Gain (___ Lbs)] : no recent weight gain [Recent Weight Loss (___ Lbs)] : no recent weight loss [Blurred Vision] : no blurred vision [Chest Pain] : no chest pain [Palpitations] : no palpitations [Shortness Of Breath] : no shortness of breath [SOB on Exertion] : no shortness of breath on exertion [Nausea] : no nausea [Abdominal Pain] : no abdominal pain [Vomiting] : no vomiting [Diarrhea] : no diarrhea [Polyuria] : no polyuria [Pain/Numbness of Digits] : no pain/numbness of digits [Depression] : no depression [Anxiety] : no anxiety [Polydipsia] : no polydipsia

## 2020-10-29 NOTE — PHYSICAL EXAM
[Alert] : alert [Well Nourished] : well nourished [Healthy Appearance] : healthy appearance [Well Developed] : well developed [Normal Sclera/Conjunctiva] : normal sclera/conjunctiva [EOMI] : extra ocular movement intact [No Respiratory Distress] : no respiratory distress [No Accessory Muscle Use] : no accessory muscle use [Clear to Auscultation] : lungs were clear to auscultation bilaterally [Normal S1, S2] : normal S1 and S2 [Normal Rate] : heart rate was normal [No Edema] : no peripheral edema [Normal Bowel Sounds] : normal bowel sounds [Not Tender] : non-tender [Not Distended] : not distended [Soft] : abdomen soft [Cranial Nerves Intact] : cranial nerves 2-12 were intact [Oriented x3] : oriented to person, place, and time [Normal Affect] : the affect was normal [Normal Insight/Judgement] : insight and judgment were intact [Normal Mood] : the mood was normal [No LAD] : no lymphadenopathy [No Thyroid Nodules] : no palpable thyroid nodules [Foot Ulcers] : no foot ulcers [2+] : 2+ in the dorsalis pedis [Vibration Dec.] : normal vibratory sensation at the level of the toes [Position Sense Dec.] : normal position sense at the level of the toes [Diminished Throughout Both Feet] : normal tactile sensation with monofilament testing throughout both feet

## 2020-12-29 ENCOUNTER — RX RENEWAL (OUTPATIENT)
Age: 60
End: 2020-12-29

## 2021-01-06 ENCOUNTER — NON-APPOINTMENT (OUTPATIENT)
Age: 61
End: 2021-01-06

## 2021-01-13 ENCOUNTER — NON-APPOINTMENT (OUTPATIENT)
Age: 61
End: 2021-01-13

## 2021-02-11 ENCOUNTER — RESULT CHARGE (OUTPATIENT)
Age: 61
End: 2021-02-11

## 2021-02-12 ENCOUNTER — TRANSCRIPTION ENCOUNTER (OUTPATIENT)
Age: 61
End: 2021-02-12

## 2021-02-12 ENCOUNTER — APPOINTMENT (OUTPATIENT)
Dept: ENDOCRINOLOGY | Facility: CLINIC | Age: 61
End: 2021-02-12
Payer: COMMERCIAL

## 2021-02-12 VITALS
BODY MASS INDEX: 30.53 KG/M2 | DIASTOLIC BLOOD PRESSURE: 84 MMHG | OXYGEN SATURATION: 97 % | HEART RATE: 82 BPM | SYSTOLIC BLOOD PRESSURE: 144 MMHG | TEMPERATURE: 97.7 F | HEIGHT: 66 IN | WEIGHT: 190 LBS

## 2021-02-12 DIAGNOSIS — U07.1 COVID-19: ICD-10-CM

## 2021-02-12 LAB — GLUCOSE BLDC GLUCOMTR-MCNC: 209

## 2021-02-12 PROCEDURE — 99214 OFFICE O/P EST MOD 30 MIN: CPT | Mod: 25

## 2021-02-12 PROCEDURE — 82962 GLUCOSE BLOOD TEST: CPT

## 2021-02-12 PROCEDURE — 99072 ADDL SUPL MATRL&STAF TM PHE: CPT

## 2021-02-12 NOTE — ASSESSMENT
[FreeTextEntry1] : 1. T2DM without comp - slight loss of control\par - cont Januvia\par - cont MFN  mg mg daily, try and take 4 tabs daily\par - needs to watch diet better\par - repeat A1c 3 months\par - cont testing FS 1x daily call office with highs/lows\par \par 2. stable, asymptomatic multiple thyroid nodules, benign FNA in past - repeat thyroid sono 6/2021\par \par 3. HLD - controlled, cont statin

## 2021-02-12 NOTE — DATA REVIEWED
[FreeTextEntry1] : Thyroid FNA 2017 LMP and LLP nodules - benign follicular nodules\par \par Thyroid sono 5/2018 enlarged thyroid with bilateral thyroid nodules -  minimal changes\par \par Thyroid sono 5/9/19 normal thyroid size, mildly heterogenous, multiple thyroid nodules stable/decreased in size\par RLP nodule 9x8x5 mm\par RMP nodule 8x4x4 mm\par LMP nodule 10x9x9 mm\par LMP nodule 8x6x5 mm\par LMP nodule [id number] mm\par \par Thyroid sono 6/17/20\par RMp nodule 7x3x5 mm - hypoechoic, stable\par RLP nodule 10x6x9 mm - heterogeneous, stable\par LMP nodule 10x7x8 mm heterogeneous, stable, benign FNA in 2017\par LLP nodule [id number] mm heterogeneous nodule, w internal vascularity, stable. Benign FNA in 2017\par LMLP nodule 6x4x7 mm - hypoechoic, stable\par ==================================================\par Labs 1/2019\par TSH 0.73\par A1c 6.6%\par urine microalb/Cr 5\par \par Labs 5/16/19\par LDL 50, HDL 67, Tg 150\par A1c 6.5%\par \par Labs 12/10/19\par Gluc 168, A1c 7%\par Cr 0.70, GFR 94\par urine microalb/Cr 3\par LDL 56, HDl 81, tg 132\par \par Labs 6/1/20 \par Gluc 160, A1c 7%\par Cr 0.72, GFR 91\par urine microalb/Cr 8\par LDL 64, HDL 73, Tg 148\par TSH 0.86\par B12 777\par \par Labs 10/26/20\par LDL 62, HDL 73, Tg 164\par Gluc 153, A1c 7.2\par Cr 0.72, GFR 90\par \par Labs 2/10/21\par urine alb/Cr 3\par Tg 150, HDL 73, LDL 45\par Gluc 154, A1c 7.2\par Cr 0.67, GFR 95\par TSH 0.82\par B12 602\par

## 2021-02-12 NOTE — HISTORY OF PRESENT ILLNESS
[FreeTextEntry1] : Interval Hx: (+) COVID 12/2020\par \par 1. T2DM dx 2009 on routine blood test. Controlled -reports higher numbers lately, worsening due to diet\par Past meds: Farxiga caused yeast infections\par \par Current DM meds: \par MFN  mg 2 tabs at bedtime and 1 in am , cannot tolerate higher doses\par Januvia 100 mg daily\par \par SMBG: testing 1x daily, 140-190's, some 200's\par \par Modifying factors - worse due to diet\par Diet: "not good", worse over holidays\par Exercise: not regularly\par \par Complications:\par 11/2020 eye exam no DR\par (+) neuropathy ?diabetes ?radiculopathy - following with neuro, on gabapentin\par 2/2021 neg urine microalb/cr\par \par 2. Thyroid nodules - no thyroid meds, denies anterior neck pain, dysphagia or chronic voice changes.\par 3. Hyperlipidemia - on rosuvastatin 10 mg daily\par

## 2021-02-12 NOTE — REASON FOR VISIT
[Follow - Up] : a follow-up visit [DM Type 2] : DM Type 2 [Thyroid nodule/ MNG] : thyroid nodule/ MNG [Other___] : [unfilled]

## 2021-02-12 NOTE — PHYSICAL EXAM
[Alert] : alert [Well Nourished] : well nourished [Healthy Appearance] : healthy appearance [Well Developed] : well developed [Normal Sclera/Conjunctiva] : normal sclera/conjunctiva [EOMI] : extra ocular movement intact [No LAD] : no lymphadenopathy [No Thyroid Nodules] : no palpable thyroid nodules [No Respiratory Distress] : no respiratory distress [No Accessory Muscle Use] : no accessory muscle use [Clear to Auscultation] : lungs were clear to auscultation bilaterally [Normal S1, S2] : normal S1 and S2 [Normal Rate] : heart rate was normal [No Edema] : no peripheral edema [Normal Bowel Sounds] : normal bowel sounds [Not Tender] : non-tender [Not Distended] : not distended [Soft] : abdomen soft [Cranial Nerves Intact] : cranial nerves 2-12 were intact [Oriented x3] : oriented to person, place, and time [Normal Affect] : the affect was normal [Normal Insight/Judgement] : insight and judgment were intact [Normal Mood] : the mood was normal

## 2021-05-04 ENCOUNTER — RX RENEWAL (OUTPATIENT)
Age: 61
End: 2021-05-04

## 2021-05-18 ENCOUNTER — RX RENEWAL (OUTPATIENT)
Age: 61
End: 2021-05-18

## 2021-06-02 LAB
HBA1C MFR BLD HPLC: 7.2
HBA1C MFR BLD HPLC: 7.2
LDLC SERPL DIRECT ASSAY-MCNC: 45
LDLC SERPL DIRECT ASSAY-MCNC: 46
MICROALBUMIN/CREAT 24H UR-RTO: 3

## 2021-06-03 ENCOUNTER — RESULT CHARGE (OUTPATIENT)
Age: 61
End: 2021-06-03

## 2021-06-03 ENCOUNTER — APPOINTMENT (OUTPATIENT)
Dept: ENDOCRINOLOGY | Facility: CLINIC | Age: 61
End: 2021-06-03
Payer: COMMERCIAL

## 2021-06-03 VITALS
OXYGEN SATURATION: 98 % | BODY MASS INDEX: 29.89 KG/M2 | TEMPERATURE: 98.8 F | DIASTOLIC BLOOD PRESSURE: 80 MMHG | WEIGHT: 186 LBS | HEART RATE: 92 BPM | SYSTOLIC BLOOD PRESSURE: 144 MMHG | HEIGHT: 66 IN

## 2021-06-03 LAB — GLUCOSE BLDC GLUCOMTR-MCNC: 259

## 2021-06-03 PROCEDURE — 99214 OFFICE O/P EST MOD 30 MIN: CPT | Mod: 25

## 2021-06-03 PROCEDURE — 99072 ADDL SUPL MATRL&STAF TM PHE: CPT

## 2021-06-03 PROCEDURE — 82962 GLUCOSE BLOOD TEST: CPT

## 2021-06-03 RX ORDER — SITAGLIPTIN 100 MG/1
100 TABLET, FILM COATED ORAL
Qty: 90 | Refills: 2 | Status: DISCONTINUED | COMMUNITY
Start: 2019-07-12 | End: 2021-06-03

## 2021-06-03 NOTE — PHYSICAL EXAM
[Alert] : alert [Well Nourished] : well nourished [Healthy Appearance] : healthy appearance [Well Developed] : well developed [Normal Sclera/Conjunctiva] : normal sclera/conjunctiva [EOMI] : extra ocular movement intact [No LAD] : no lymphadenopathy [No Thyroid Nodules] : no palpable thyroid nodules [No Respiratory Distress] : no respiratory distress [No Accessory Muscle Use] : no accessory muscle use [Clear to Auscultation] : lungs were clear to auscultation bilaterally [Normal S1, S2] : normal S1 and S2 [Normal Rate] : heart rate was normal [No Edema] : no peripheral edema [Normal Bowel Sounds] : normal bowel sounds [Not Tender] : non-tender [Not Distended] : not distended [Soft] : abdomen soft [Cranial Nerves Intact] : cranial nerves 2-12 were intact [Oriented x3] : oriented to person, place, and time [Normal Affect] : the affect was normal [Normal Insight/Judgement] : insight and judgment were intact [Normal Mood] : the mood was normal [Foot Ulcers] : no foot ulcers [2+] : 2+ in the dorsalis pedis [Vibration Dec.] : normal vibratory sensation at the level of the toes [Position Sense Dec.] : normal position sense at the level of the toes [Diminished Throughout Both Feet] : normal tactile sensation with monofilament testing throughout both feet

## 2021-06-03 NOTE — ASSESSMENT
[FreeTextEntry1] : 1. T2DM without comp - slight loss of control\par - stop Januvia, try Ozempic 0.25 mg weekly - risks/benefits discussed\par - cont MFN  mg 3 tabs daily, cannot tolerate higher doses\par - cont lifestyle changes w Noom\par - repeat A1c 3 months\par - cont testing FS 1x daily call office with highs/lows\par \par 2. stable, asymptomatic multiple thyroid nodules, benign FNA in past - repeat thyroid sono 6/2021\par \par 3. HLD - controlled, cont statin, repeat lipids 3 months\par \par 4. Obesity - cont lifestyle changes, Ozempic may also help w weight loss

## 2021-06-03 NOTE — REVIEW OF SYSTEMS
[Nocturia] : nocturia [Pain/Numbness of Digits] : pain/numbness of digits [Fatigue] : no fatigue [Recent Weight Gain (___ Lbs)] : no recent weight gain [Recent Weight Loss (___ Lbs)] : no recent weight loss [Blurred Vision] : no blurred vision [Chest Pain] : no chest pain [Palpitations] : no palpitations [Shortness Of Breath] : no shortness of breath [SOB on Exertion] : no shortness of breath on exertion [Nausea] : no nausea [Abdominal Pain] : no abdominal pain [Vomiting] : no vomiting [Diarrhea] : no diarrhea [Polyuria] : no polyuria [Depression] : no depression [Anxiety] : no anxiety [Polydipsia] : no polydipsia

## 2021-06-03 NOTE — DATA REVIEWED
[FreeTextEntry1] : Thyroid FNA 2017 LMP and LLP nodules - benign follicular nodules\par \par Thyroid sono 5/2018 enlarged thyroid with bilateral thyroid nodules -  minimal changes\par \par Thyroid sono 5/9/19 normal thyroid size, mildly heterogenous, multiple thyroid nodules stable/decreased in size\par RLP nodule 9x8x5 mm\par RMP nodule 8x4x4 mm\par LMP nodule 10x9x9 mm\par LMP nodule 8x6x5 mm\par LMP nodule [id number] mm\par \par Thyroid sono 6/17/20\par RMp nodule 7x3x5 mm - hypoechoic, stable\par RLP nodule 10x6x9 mm - heterogeneous, stable\par LMP nodule 10x7x8 mm heterogeneous, stable, benign FNA in 2017\par LLP nodule [id number] mm heterogeneous nodule, w internal vascularity, stable. Benign FNA in 2017\par LMLP nodule 6x4x7 mm - hypoechoic, stable\par ==================================================\par Labs 1/2019\par TSH 0.73\par A1c 6.6%\par urine microalb/Cr 5\par \par Labs 5/16/19\par LDL 50, HDL 67, Tg 150\par A1c 6.5%\par \par Labs 12/10/19\par Gluc 168, A1c 7%\par Cr 0.70, GFR 94\par urine microalb/Cr 3\par LDL 56, HDl 81, tg 132\par \par Labs 6/1/20 \par Gluc 160, A1c 7%\par Cr 0.72, GFR 91\par urine microalb/Cr 8\par LDL 64, HDL 73, Tg 148\par TSH 0.86\par B12 777\par \par Labs 10/26/20\par LDL 62, HDL 73, Tg 164\par Gluc 153, A1c 7.2\par Cr 0.72, GFR 90\par \par Labs 2/10/21\par urine alb/Cr 3\par Tg 150, HDL 73, LDL 45\par Gluc 154, A1c 7.2\par Cr 0.67, GFR 95\par TSH 0.82\par B12 602\par \par Labs 6/1/21\par LDL 46, HDL 65, Tg 111\par Gluc 159, A1c 7.2\par Cr 0.65, GFR 96\par

## 2021-06-03 NOTE — HISTORY OF PRESENT ILLNESS
[FreeTextEntry1] : Interval Hx: ate cereal and banana this am and FS today 259\par \par 1. T2DM dx 2009 on routine blood test. Fasting hyperglycemia. improved w meds and diet\par Past meds: Farxiga caused yeast infections\par \par Current DM meds: \par MFN  mg 2 tabs at bedtime and 1 in am , cannot tolerate higher doses\par Januvia 100 mg daily\par \par SMBG: testing 1x daily, 140-190's, some 200's\par \par Diet: signed up for Noom for past 2 months, more protein, stress eating\par \par Complications:\par 11/2020 eye exam no DR\par (+) neuropathy ?diabetes ?radiculopathy - following with neuro, on gabapentin and amitryptilline\par 2/2021 neg urine microalb/cr\par \par 2. Thyroid nodules - no thyroid meds, denies anterior neck pain, dysphagia or chronic voice changes.\par 3. Hyperlipidemia - on rosuvastatin 10 mg daily\par

## 2021-06-15 ENCOUNTER — RX RENEWAL (OUTPATIENT)
Age: 61
End: 2021-06-15

## 2021-07-01 ENCOUNTER — NON-APPOINTMENT (OUTPATIENT)
Age: 61
End: 2021-07-01

## 2021-07-09 ENCOUNTER — RX RENEWAL (OUTPATIENT)
Age: 61
End: 2021-07-09

## 2021-07-20 ENCOUNTER — TRANSCRIPTION ENCOUNTER (OUTPATIENT)
Age: 61
End: 2021-07-20

## 2021-08-03 ENCOUNTER — RX RENEWAL (OUTPATIENT)
Age: 61
End: 2021-08-03

## 2021-08-11 ENCOUNTER — NON-APPOINTMENT (OUTPATIENT)
Age: 61
End: 2021-08-11

## 2021-09-08 LAB
HBA1C MFR BLD HPLC: 6
LDLC SERPL DIRECT ASSAY-MCNC: 45

## 2021-09-09 ENCOUNTER — APPOINTMENT (OUTPATIENT)
Dept: ENDOCRINOLOGY | Facility: CLINIC | Age: 61
End: 2021-09-09
Payer: COMMERCIAL

## 2021-09-09 ENCOUNTER — RESULT CHARGE (OUTPATIENT)
Age: 61
End: 2021-09-09

## 2021-09-09 VITALS
BODY MASS INDEX: 27 KG/M2 | WEIGHT: 168 LBS | OXYGEN SATURATION: 97 % | SYSTOLIC BLOOD PRESSURE: 120 MMHG | HEIGHT: 66 IN | HEART RATE: 89 BPM | DIASTOLIC BLOOD PRESSURE: 74 MMHG

## 2021-09-09 LAB — GLUCOSE BLDC GLUCOMTR-MCNC: 149

## 2021-09-09 PROCEDURE — 99214 OFFICE O/P EST MOD 30 MIN: CPT | Mod: 25

## 2021-09-09 PROCEDURE — 82962 GLUCOSE BLOOD TEST: CPT

## 2021-09-09 NOTE — ASSESSMENT
[FreeTextEntry1] : 1. T2DM without comp - improved control\par - cont Ozempic 0.5 mg weekly\par - cont MFN  mg 3 tabs daily, cannot tolerate higher doses\par - cont lifestyle changes\par - repeat A1c 3 months\par \par 2. stable, asymptomatic multiple thyroid nodules, benign FNA in past - repeat thyroid sono 2022\par \par 3. HLD - controlled, cont statin, repeat lipids 3 months\par \par 4. Obesity - recent weight loss, cont Ozempic and lifestyle changes

## 2021-09-09 NOTE — REVIEW OF SYSTEMS
[Nocturia] : nocturia [Pain/Numbness of Digits] : pain/numbness of digits [Recent Weight Loss (___ Lbs)] : recent weight loss: [unfilled] lbs [Nausea] : nausea [Fatigue] : no fatigue [Recent Weight Gain (___ Lbs)] : no recent weight gain [Blurred Vision] : no blurred vision [Chest Pain] : no chest pain [Palpitations] : no palpitations [Shortness Of Breath] : no shortness of breath [SOB on Exertion] : no shortness of breath on exertion [Abdominal Pain] : no abdominal pain [Vomiting] : no vomiting [Diarrhea] : no diarrhea [Polyuria] : no polyuria [Depression] : no depression [Anxiety] : no anxiety [Polydipsia] : no polydipsia

## 2021-09-09 NOTE — DATA REVIEWED
[FreeTextEntry1] : Thyroid FNA 2017 LMP and LLP nodules - benign follicular nodules\par \par Thyroid sono 5/2018 enlarged thyroid with bilateral thyroid nodules -  minimal changes\par \par Thyroid sono 5/9/19 normal thyroid size, mildly heterogenous, multiple thyroid nodules stable/decreased in size\par RLP nodule 9x8x5 mm\par RMP nodule 8x4x4 mm\par LMP nodule 10x9x9 mm\par LMP nodule 8x6x5 mm\par LMP nodule [id number] mm\par \par Thyroid sono 6/17/20\par RMp nodule 7x3x5 mm - hypoechoic, stable\par RLP nodule 10x6x9 mm - heterogeneous, stable\par LMP nodule 10x7x8 mm heterogeneous, stable, benign FNA in 2017\par LLP nodule [id number] mm heterogeneous nodule, w internal vascularity, stable. Benign FNA in 2017\par LMLP nodule 6x4x7 mm - hypoechoic, stable\par \par Thyroid sonogram 7/8/21\par RMP bilobed colloid cyst 4x4x3 mm \par RLP posterior hypoechoic nodule 9x6x8 mm\par RLP anterior colloid cyst 5x5x5 mm\par LUMP hypoechoic nodule 6x9x8 mm -stable\par LUMP hypoechoic nodule 8x6x6 mm - new\par LLP hypoechoic nodule [id number] mm - stable but increased calcifications, radiology rec FNA biopsy\par \par Thyroid FNA biopsy 8/9/21\par Left lower pole thyroid nodules - benign follicular nodule, consistent with nodular goiter and posthemorrhagic cystic change. cat 2\par ==================================================\par Labs 1/2019\par TSH 0.73\par A1c 6.6%\par urine microalb/Cr 5\par \par Labs 5/16/19\par LDL 50, HDL 67, Tg 150\par A1c 6.5%\par \par Labs 12/10/19\par Gluc 168, A1c 7%\par Cr 0.70, GFR 94\par urine microalb/Cr 3\par LDL 56, HDl 81, tg 132\par \par Labs 6/1/20 \par Gluc 160, A1c 7%\par Cr 0.72, GFR 91\par urine microalb/Cr 8\par LDL 64, HDL 73, Tg 148\par TSH 0.86\par B12 777\par \par Labs 10/26/20\par LDL 62, HDL 73, Tg 164\par Gluc 153, A1c 7.2\par Cr 0.72, GFR 90\par \par Labs 2/10/21\par urine alb/Cr 3\par Tg 150, HDL 73, LDL 45\par Gluc 154, A1c 7.2\par Cr 0.67, GFR 95\par TSH 0.82\par B12 602\par \par Labs 6/1/21\par LDL 46, HDL 65, Tg 111\par Gluc 159, A1c 7.2\par Cr 0.65, GFR 96\par \par Labs 9/7/21\par Gluc 112, A1c 6%\par Cr 0.61, GFR 97\par LDL 45, HDL 56, Tg 122\par

## 2021-09-09 NOTE — HISTORY OF PRESENT ILLNESS
[FreeTextEntry1] : Interval Hx: losing weight with Ozempic\par \par 1. T2DM dx 2009 on routine blood test. Fasting hyperglycemia. improved w meds and diet\par Past meds: Farxiga caused yeast infections\par \par Current DM meds: \par MFN  mg 2 tabs at bedtime and 1 in am , cannot tolerate higher doses\par Ozempic 0.5 mg weekly - tolerating\par \par SMBG: testing 1x daily, -150's\par \par Diet: decreased appetite w Ozepmic\par \par Complications:\par 11/2020 eye exam no DR\par (+) neuropathy ?diabetes ?radiculopathy - following with neuro, on gabapentin and amitryptilline\par 2/2021 neg urine microalb/cr\par \par 2. Thyroid nodules - no thyroid meds, denies anterior neck pain, dysphagia or chronic voice changes.\par 3. Hyperlipidemia - on rosuvastatin 10 mg daily\par

## 2021-09-09 NOTE — PHYSICAL EXAM
[Alert] : alert [Well Nourished] : well nourished [Healthy Appearance] : healthy appearance [Well Developed] : well developed [Normal Sclera/Conjunctiva] : normal sclera/conjunctiva [EOMI] : extra ocular movement intact [No Thyroid Nodules] : no palpable thyroid nodules [No LAD] : no lymphadenopathy [No Respiratory Distress] : no respiratory distress [No Accessory Muscle Use] : no accessory muscle use [Clear to Auscultation] : lungs were clear to auscultation bilaterally [Normal S1, S2] : normal S1 and S2 [Normal Rate] : heart rate was normal [No Edema] : no peripheral edema [Normal Bowel Sounds] : normal bowel sounds [Not Tender] : non-tender [Not Distended] : not distended [Soft] : abdomen soft [2+] : 2+ in the dorsalis pedis [Cranial Nerves Intact] : cranial nerves 2-12 were intact [Oriented x3] : oriented to person, place, and time [Normal Affect] : the affect was normal [Normal Insight/Judgement] : insight and judgment were intact [Normal Mood] : the mood was normal [Foot Ulcers] : no foot ulcers [Vibration Dec.] : normal vibratory sensation at the level of the toes [Position Sense Dec.] : normal position sense at the level of the toes [Diminished Throughout Both Feet] : normal tactile sensation with monofilament testing throughout both feet

## 2021-10-05 ENCOUNTER — RX RENEWAL (OUTPATIENT)
Age: 61
End: 2021-10-05

## 2021-10-25 ENCOUNTER — RX RENEWAL (OUTPATIENT)
Age: 61
End: 2021-10-25

## 2021-10-26 ENCOUNTER — APPOINTMENT (OUTPATIENT)
Dept: FAMILY MEDICINE | Facility: CLINIC | Age: 61
End: 2021-10-26

## 2021-12-08 LAB
HBA1C MFR BLD HPLC: 5.7
LDLC SERPL DIRECT ASSAY-MCNC: 45

## 2021-12-09 ENCOUNTER — RESULT CHARGE (OUTPATIENT)
Age: 61
End: 2021-12-09

## 2021-12-09 ENCOUNTER — APPOINTMENT (OUTPATIENT)
Dept: ENDOCRINOLOGY | Facility: CLINIC | Age: 61
End: 2021-12-09
Payer: COMMERCIAL

## 2021-12-09 VITALS
WEIGHT: 159 LBS | DIASTOLIC BLOOD PRESSURE: 72 MMHG | HEART RATE: 87 BPM | BODY MASS INDEX: 25.55 KG/M2 | HEIGHT: 66 IN | SYSTOLIC BLOOD PRESSURE: 120 MMHG

## 2021-12-09 DIAGNOSIS — E66.9 OBESITY, UNSPECIFIED: ICD-10-CM

## 2021-12-09 LAB
GLUCOSE BLDC GLUCOMTR-MCNC: 104
PODIATRY EVAL: NORMAL

## 2021-12-09 PROCEDURE — 82962 GLUCOSE BLOOD TEST: CPT

## 2021-12-09 PROCEDURE — 99214 OFFICE O/P EST MOD 30 MIN: CPT | Mod: 25

## 2021-12-09 RX ORDER — PANTOPRAZOLE 20 MG/1
20 TABLET, DELAYED RELEASE ORAL DAILY
Refills: 0 | Status: DISCONTINUED | COMMUNITY
End: 2021-12-09

## 2021-12-09 NOTE — DATA REVIEWED
[FreeTextEntry1] : Thyroid FNA 2017 LMP and LLP nodules - benign follicular nodules\par \par Thyroid sono 5/2018 enlarged thyroid with bilateral thyroid nodules -  minimal changes\par \par Thyroid sono 5/9/19 normal thyroid size, mildly heterogenous, multiple thyroid nodules stable/decreased in size\par RLP nodule 9x8x5 mm\par RMP nodule 8x4x4 mm\par LMP nodule 10x9x9 mm\par LMP nodule 8x6x5 mm\par LMP nodule [id number] mm\par \par Thyroid sono 6/17/20\par RMp nodule 7x3x5 mm - hypoechoic, stable\par RLP nodule 10x6x9 mm - heterogeneous, stable\par LMP nodule 10x7x8 mm heterogeneous, stable, benign FNA in 2017\par LLP nodule [id number] mm heterogeneous nodule, w internal vascularity, stable. Benign FNA in 2017\par LMLP nodule 6x4x7 mm - hypoechoic, stable\par \par Thyroid sonogram 7/8/21\par RMP bilobed colloid cyst 4x4x3 mm \par RLP posterior hypoechoic nodule 9x6x8 mm\par RLP anterior colloid cyst 5x5x5 mm\par LUMP hypoechoic nodule 6x9x8 mm -stable\par LUMP hypoechoic nodule 8x6x6 mm - new\par LLP hypoechoic nodule [id number] mm - stable but increased calcifications, radiology rec FNA biopsy\par \par Thyroid FNA biopsy 8/9/21\par Left lower pole thyroid nodules - benign follicular nodule, consistent with nodular goiter and posthemorrhagic cystic change. cat 2\par ==================================================\par Labs 1/2019\par TSH 0.73\par A1c 6.6%\par urine microalb/Cr 5\par \par Labs 5/16/19\par LDL 50, HDL 67, Tg 150\par A1c 6.5%\par \par Labs 12/10/19\par Gluc 168, A1c 7%\par Cr 0.70, GFR 94\par urine microalb/Cr 3\par LDL 56, HDl 81, tg 132\par \par Labs 6/1/20 \par Gluc 160, A1c 7%\par Cr 0.72, GFR 91\par urine microalb/Cr 8\par LDL 64, HDL 73, Tg 148\par TSH 0.86\par B12 777\par \par Labs 10/26/20\par LDL 62, HDL 73, Tg 164\par Gluc 153, A1c 7.2\par Cr 0.72, GFR 90\par \par Labs 2/10/21\par urine alb/Cr 3\par Tg 150, HDL 73, LDL 45\par Gluc 154, A1c 7.2\par Cr 0.67, GFR 95\par TSH 0.82\par B12 602\par \par Labs 6/1/21\par LDL 46, HDL 65, Tg 111\par Gluc 159, A1c 7.2\par Cr 0.65, GFR 96\par \par Labs 9/7/21\par Gluc 112, A1c 6%\par Cr 0.61, GFR 97\par LDL 45, HDL 56, Tg 122\par \par Labs 12/2/21 \par LDL 45, HDL 70, Tg 115\par Gluc 141, A1c 5.7\par Cr 0.63, GFR 96\par

## 2021-12-09 NOTE — ASSESSMENT
[FreeTextEntry1] : 1. T2DM without comp - improved control, A1c 5.7%\par - cont Ozempic 0.5 mg weekly\par - decrease MFN  mg 1 tab daily\par - cont lifestyle changes\par - repeat A1c 3 months\par \par 2. stable, asymptomatic multiple thyroid nodules, benign FNA in past - repeat thyroid sono summer 2022\par \par 3. HLD - controlled, cont statin, repeat lipids 3 months\par \par 4. Obesity - recent weight loss. BMI now 25, cont Ozempic and lifestyle changes

## 2021-12-09 NOTE — HISTORY OF PRESENT ILLNESS
[FreeTextEntry1] : Interval Hx: losing weight with Ozempic\par \par 1. T2DM dx 2009 on routine blood test. Fasting hyperglycemia. improved w meds and diet\par Past meds: Farxiga caused yeast infections\par \par Current DM meds: \par MFN  mg 2 tabs at bedtime and 1 in am , cannot tolerate higher doses\par Ozempic 0.5 mg weekly - tolerating\par \par SMBG: not testing\par \par Diet: decreased appetite w Ozepmic\par \par Complications:\par 11/2021 eye exam no DR\par (+) neuropathy ?diabetes ?radiculopathy - following with neuro, on gabapentin and amitryptilline\par 2/2021 neg urine microalb/cr\par \par 2. Thyroid nodules - no thyroid meds, denies anterior neck pain, dysphagia or chronic voice changes.\par 3. Hyperlipidemia - on rosuvastatin 10 mg daily\par

## 2021-12-09 NOTE — REVIEW OF SYSTEMS
[Recent Weight Loss (___ Lbs)] : recent weight loss: [unfilled] lbs [Pain/Numbness of Digits] : pain/numbness of digits [Fatigue] : no fatigue [Recent Weight Gain (___ Lbs)] : no recent weight gain [Blurred Vision] : no blurred vision [Chest Pain] : no chest pain [Shortness Of Breath] : no shortness of breath [SOB on Exertion] : no shortness of breath on exertion [Nausea] : no nausea [Constipation] : no constipation [Abdominal Pain] : no abdominal pain [Vomiting] : no vomiting [Diarrhea] : no diarrhea [Polyuria] : no polyuria [Nocturia] : no nocturia [Depression] : no depression [Anxiety] : no anxiety [Polydipsia] : no polydipsia

## 2021-12-09 NOTE — PHYSICAL EXAM
[Alert] : alert [Well Nourished] : well nourished [Healthy Appearance] : healthy appearance [Well Developed] : well developed [Normal Sclera/Conjunctiva] : normal sclera/conjunctiva [EOMI] : extra ocular movement intact [No LAD] : no lymphadenopathy [No Thyroid Nodules] : no palpable thyroid nodules [No Respiratory Distress] : no respiratory distress [No Accessory Muscle Use] : no accessory muscle use [Clear to Auscultation] : lungs were clear to auscultation bilaterally [Normal S1, S2] : normal S1 and S2 [Normal Rate] : heart rate was normal [No Edema] : no peripheral edema [Normal Bowel Sounds] : normal bowel sounds [Not Tender] : non-tender [Not Distended] : not distended [Soft] : abdomen soft [Oriented x3] : oriented to person, place, and time [Normal Affect] : the affect was normal [Normal Insight/Judgement] : insight and judgment were intact [Normal Mood] : the mood was normal

## 2021-12-26 ENCOUNTER — EMERGENCY (EMERGENCY)
Facility: HOSPITAL | Age: 61
LOS: 1 days | Discharge: HOME | End: 2021-12-26
Payer: COMMERCIAL

## 2021-12-26 VITALS
HEIGHT: 63 IN | WEIGHT: 139.99 LBS | OXYGEN SATURATION: 100 % | DIASTOLIC BLOOD PRESSURE: 65 MMHG | RESPIRATION RATE: 16 BRPM | SYSTOLIC BLOOD PRESSURE: 136 MMHG | HEART RATE: 84 BPM | TEMPERATURE: 98 F

## 2021-12-26 LAB — SARS-COV-2 RNA SPEC QL NAA+PROBE: SIGNIFICANT CHANGE UP

## 2021-12-26 PROCEDURE — U0003: CPT

## 2021-12-26 PROCEDURE — 99283 EMERGENCY DEPT VISIT LOW MDM: CPT

## 2021-12-26 PROCEDURE — 99284 EMERGENCY DEPT VISIT MOD MDM: CPT

## 2021-12-26 PROCEDURE — U0005: CPT

## 2021-12-26 NOTE — ED STATDOCS - COVID-19 ORDERING FACILITY
[facility]  - [facility] Pandemic Response
Alert and oriented to person, place and time

## 2021-12-26 NOTE — ED STATDOCS - CLINICAL SUMMARY MEDICAL DECISION MAKING FREE TEXT BOX
Pt nontoxic appearing, stable vitals, ambulatory with stable saturation without supplemental oxygen. PT does not meet criteria listed in most updated guidelines as per [facility] protocol/algorithm for admission at this time. pt advised about self-quarantine instructions until negative test results and/or symptom resolution. pt advised on hand hygiene, monitoring of symptoms, antipyretic use as well as and fu with primary care provider. Instructions given in pre-printed copy. COVID-19 PCR sent and pt given strict return precautions.

## 2021-12-26 NOTE — ED STATDOCS - PATIENT PORTAL LINK FT
You can access the FollowMyHealth Patient Portal offered by [facility] by registering at the following website: [email or web address]. By joining [facility]’s FollowMyHealth portal, you will also be able to view your health information using other applications (apps) compatible with our system.

## 2022-01-07 ENCOUNTER — APPOINTMENT (OUTPATIENT)
Dept: FAMILY MEDICINE | Facility: CLINIC | Age: 62
End: 2022-01-07

## 2022-01-10 ENCOUNTER — APPOINTMENT (OUTPATIENT)
Dept: FAMILY MEDICINE | Facility: CLINIC | Age: 62
End: 2022-01-10
Payer: COMMERCIAL

## 2022-01-10 VITALS
TEMPERATURE: 97.6 F | OXYGEN SATURATION: 98 % | SYSTOLIC BLOOD PRESSURE: 118 MMHG | DIASTOLIC BLOOD PRESSURE: 70 MMHG | HEIGHT: 66 IN | WEIGHT: 155 LBS | HEART RATE: 94 BPM | RESPIRATION RATE: 16 BRPM | BODY MASS INDEX: 24.91 KG/M2

## 2022-01-10 PROCEDURE — 99214 OFFICE O/P EST MOD 30 MIN: CPT

## 2022-01-10 NOTE — REVIEW OF SYSTEMS
[Patient Intake Form Reviewed] : Patient intake form was reviewed [Negative] : Heme/Lymph [FreeTextEntry2] : Overweight [FreeTextEntry5] : DEE [de-identified] : Dry Skin

## 2022-01-10 NOTE — HISTORY OF PRESENT ILLNESS
[FreeTextEntry1] : 60 y/o F with PMH of HLD, DMT2, Thyroid nodules, RADHA. pt c/o left sided pain, intermittent, sharp stabbing pain ongoing since 12/25, onset was sudden upon bending down to pick something up. left flank pain that radiates down left sided lower back and around to the LUQ \par denies trauma, falling, denies dizziness, LOC, denies urinary changes, changes in BMs, denies N/V/D/C, \par pt denies this ever happening in the past. pain is exacerbated on valsalva and movement. not present at rest. has tried ice and advil without relief.

## 2022-01-10 NOTE — ASSESSMENT
[FreeTextEntry1] : 62 y/o F with PMH of HLD, DMT2, Thyroid nodules, RADHA. pt c/o left sided pain, intermittent, sharp stabbing pain ongoing since 12/25, onset was sudden upon bending down to pick something up \par denies trauma, falling, denies dizziness, LOC, denies urinary changes, changes in BMs, denies N/V/D/C, \par pt denies this ever happening in the past. pain is exacerbated on valsalva and movement. not present at rest. has tried ice and advil without relief. \par \par Xray rib and Lumbar\par Rest and ICE \par Meds   RTC 10

## 2022-01-10 NOTE — HEALTH RISK ASSESSMENT
[Former] : Former [Yes] : Yes [Monthly or less (1 pt)] : Monthly or less (1 point) [1 or 2 (0 pts)] : 1 or 2 (0 points) [Never (0 pts)] : Never (0 points) [No] : In the past 12 months have you used drugs other than those required for medical reasons? No [No falls in past year] : Patient reported no falls in the past year [0] : 2) Feeling down, depressed, or hopeless: Not at all (0) [PHQ-2 Negative - No further assessment needed] : PHQ-2 Negative - No further assessment needed [I will adhere to the patient's wishes.] : I will adhere to the patient's wishes. [Time Spent: ___ minutes] : Time Spent: [unfilled] minutes [YearQuit] : 2008 [Audit-CScore] : 1 [de-identified] : Average [de-identified] : Average [[facility]] : 8 [[id number]] : 0 [AdvancecareDate] : 01/22

## 2022-01-10 NOTE — PHYSICAL EXAM
[No Acute Distress] : no acute distress [Well Nourished] : well nourished [Well Developed] : well developed [Well-Appearing] : well-appearing [Normal Sclera/Conjunctiva] : normal sclera/conjunctiva [PERRL] : pupils equal round and reactive to light [EOMI] : extraocular movements intact [Normal Outer Ear/Nose] : the outer ears and nose were normal in appearance [Normal Oropharynx] : the oropharynx was normal [No JVD] : no jugular venous distention [No Lymphadenopathy] : no lymphadenopathy [Supple] : supple [Thyroid Normal, No Nodules] : the thyroid was normal and there were no nodules present [No Respiratory Distress] : no respiratory distress  [No Accessory Muscle Use] : no accessory muscle use [Clear to Auscultation] : lungs were clear to auscultation bilaterally [Normal Rate] : normal rate  [Regular Rhythm] : with a regular rhythm [Normal S1, S2] : normal S1 and S2 [No Murmur] : no murmur heard [No Carotid Bruits] : no carotid bruits [No Abdominal Bruit] : a ~M bruit was not heard ~T in the abdomen [No Varicosities] : no varicosities [Pedal Pulses Present] : the pedal pulses are present [No Edema] : there was no peripheral edema [No Palpable Aorta] : no palpable aorta [No Extremity Clubbing/Cyanosis] : no extremity clubbing/cyanosis [Soft] : abdomen soft [Non-distended] : non-distended [No Masses] : no abdominal mass palpated [No HSM] : no HSM [Normal Bowel Sounds] : normal bowel sounds [Normal Posterior Cervical Nodes] : no posterior cervical lymphadenopathy [Normal Anterior Cervical Nodes] : no anterior cervical lymphadenopathy [No CVA Tenderness] : no CVA  tenderness [No Spinal Tenderness] : no spinal tenderness [No Joint Swelling] : no joint swelling [Grossly Normal Strength/Tone] : grossly normal strength/tone [No Rash] : no rash [Coordination Grossly Intact] : coordination grossly intact [No Focal Deficits] : no focal deficits [Normal Gait] : normal gait [Deep Tendon Reflexes (DTR)] : deep tendon reflexes were 2+ and symmetric [Normal Affect] : the affect was normal [Normal Insight/Judgement] : insight and judgment were intact [de-identified] : tender to palpation of the left upper quadrant , radiates to the left flank.  [de-identified] : h

## 2022-01-10 NOTE — COUNSELING
[Fall prevention counseling provided] : Fall prevention counseling provided [Adequate lighting] : Adequate lighting [No throw rugs] : No throw rugs [Use proper foot wear] : Use proper foot wear [Behavioral health counseling provided] : Behavioral health counseling provided [Sleep ___ hours/day] : Sleep [unfilled] hours/day [Engage in a relaxing activity] : Engage in a relaxing activity [Plan in advance] : Plan in advance [AUDIT-C Screening administered and reviewed] : AUDIT-C Screening administered and reviewed [Potential consequences of obesity discussed] : Potential consequences of obesity discussed [Benefits of weight loss discussed] : Benefits of weight loss discussed [Encouraged to increase physical activity] : Encouraged to increase physical activity [Weigh Self Weekly] : weigh self weekly [Decrease Portions] : decrease portions [None] : None [Good understanding] : Patient has a good understanding of lifestyle changes and steps needed to achieve self management goal [FreeTextEntry2] : former smoker

## 2022-01-20 ENCOUNTER — RX RENEWAL (OUTPATIENT)
Age: 62
End: 2022-01-20

## 2022-03-10 ENCOUNTER — APPOINTMENT (OUTPATIENT)
Dept: ENDOCRINOLOGY | Facility: CLINIC | Age: 62
End: 2022-03-10
Payer: COMMERCIAL

## 2022-03-10 VITALS
WEIGHT: 165 LBS | BODY MASS INDEX: 26.52 KG/M2 | HEART RATE: 85 BPM | HEIGHT: 66 IN | DIASTOLIC BLOOD PRESSURE: 70 MMHG | SYSTOLIC BLOOD PRESSURE: 120 MMHG

## 2022-03-10 LAB
GLUCOSE BLDC GLUCOMTR-MCNC: 155
HBA1C MFR BLD HPLC: 6.3
LDLC SERPL DIRECT ASSAY-MCNC: 66
MICROALBUMIN/CREAT 24H UR-RTO: 4

## 2022-03-10 PROCEDURE — 99214 OFFICE O/P EST MOD 30 MIN: CPT | Mod: 25

## 2022-03-10 PROCEDURE — 82962 GLUCOSE BLOOD TEST: CPT

## 2022-03-10 NOTE — ASSESSMENT
[FreeTextEntry1] : 1. T2DM without comp - slight loss of control\par - increase Ozempic 1 mg weekly\par - cont lifestyle changes\par - repeat A1c 3 months\par \par 2. stable, asymptomatic multiple thyroid nodules, benign FNA in past - repeat thyroid sono summer 2022\par \par 3. HLD - controlled, cont statin, repeat lipids 3 months\par \par 4. Obesity - weight gain, appetite increased, increase Ozempic and cont lifestyle changes\par \par 5. voice changes, constant throat clearing - advised ENT eval, sx unlikely due to thyroid nodules

## 2022-03-10 NOTE — PHYSICAL EXAM
[Alert] : alert [No Acute Distress] : no acute distress [EOMI] : extra ocular movement intact [No LAD] : no lymphadenopathy [No Accessory Muscle Use] : no accessory muscle use [Clear to Auscultation] : lungs were clear to auscultation bilaterally [Normal S1, S2] : normal S1 and S2 [Normal Rate] : heart rate was normal [No Edema] : no peripheral edema [Normal Gait] : normal gait [Oriented x3] : oriented to person, place, and time [Normal Affect] : the affect was normal [Normal Insight/Judgement] : insight and judgment were intact [Normal Mood] : the mood was normal [de-identified] : Left thyroid  nodule

## 2022-03-10 NOTE — HISTORY OF PRESENT ILLNESS
[FreeTextEntry1] : Interval Hx: sp cortisone shot in left hip with hyperglycemia, now eating more and gaining weight again, off MFN since 2022 and reports improved GI issues, complains of voice hoarsenss and constant throat clearing\par \par 1. T2DM dx 2009 on routine blood test. Fasting hyperglycemia. improved w meds and diet\par Past meds: Farxiga caused yeast infections. MFN ER caused GI distress\par \par Current DM meds: \par Ozempic 0.5 mg weekly - tolerating\par \par SMBG: meter reviewed - -300 after cortisone shot, otherwise -170's fasting\par \par Diet: as above\par \par Complications:\par 11/2021 eye exam no DR\par (+) neuropathy ?diabetes ?radiculopathy - following with neuro, on gabapentin and amitryptilline\par 2022 neg urine microalb/cr\par \par 2. Thyroid nodules - no thyroid meds, denies anterior neck pain, dysphagia. (+) chronic voice changes.\par 3. Hyperlipidemia - on rosuvastatin 10 mg daily\par

## 2022-03-10 NOTE — DATA REVIEWED
[FreeTextEntry1] : Thyroid FNA 2017 LMP and LLP nodules - benign follicular nodules\par \par Thyroid sono 5/2018 enlarged thyroid with bilateral thyroid nodules -  minimal changes\par \par Thyroid sono 5/9/19 normal thyroid size, mildly heterogenous, multiple thyroid nodules stable/decreased in size\par RLP nodule 9x8x5 mm\par RMP nodule 8x4x4 mm\par LMP nodule 10x9x9 mm\par LMP nodule 8x6x5 mm\par LMP nodule [id number] mm\par \par Thyroid sono 6/17/20\par RMp nodule 7x3x5 mm - hypoechoic, stable\par RLP nodule 10x6x9 mm - heterogeneous, stable\par LMP nodule 10x7x8 mm heterogeneous, stable, benign FNA in 2017\par LLP nodule [id number] mm heterogeneous nodule, w internal vascularity, stable. Benign FNA in 2017\par LMLP nodule 6x4x7 mm - hypoechoic, stable\par \par Thyroid sonogram 7/8/21\par RMP bilobed colloid cyst 4x4x3 mm \par RLP posterior hypoechoic nodule 9x6x8 mm\par RLP anterior colloid cyst 5x5x5 mm\par LUMP hypoechoic nodule 6x9x8 mm -stable\par LUMP hypoechoic nodule 8x6x6 mm - new\par LLP hypoechoic nodule [id number] mm - stable but increased calcifications, radiology rec FNA biopsy\par \par Thyroid FNA biopsy 8/9/21\par Left lower pole thyroid nodules - benign follicular nodule, consistent with nodular goiter and posthemorrhagic cystic change. cat 2\par ==================================================\par Labs 1/2019\par TSH 0.73\par A1c 6.6%\par urine microalb/Cr 5\par \par Labs 5/16/19\par LDL 50, HDL 67, Tg 150\par A1c 6.5%\par \par Labs 12/10/19\par Gluc 168, A1c 7%\par Cr 0.70, GFR 94\par urine microalb/Cr 3\par LDL 56, HDl 81, tg 132\par \par Labs 6/1/20 \par Gluc 160, A1c 7%\par Cr 0.72, GFR 91\par urine microalb/Cr 8\par LDL 64, HDL 73, Tg 148\par TSH 0.86\par B12 777\par \par Labs 10/26/20\par LDL 62, HDL 73, Tg 164\par Gluc 153, A1c 7.2\par Cr 0.72, GFR 90\par \par Labs 2/10/21\par urine alb/Cr 3\par Tg 150, HDL 73, LDL 45\par Gluc 154, A1c 7.2\par Cr 0.67, GFR 95\par TSH 0.82\par B12 602\par \par Labs 6/1/21\par LDL 46, HDL 65, Tg 111\par Gluc 159, A1c 7.2\par Cr 0.65, GFR 96\par \par Labs 9/7/21\par Gluc 112, A1c 6%\par Cr 0.61, GFR 97\par LDL 45, HDL 56, Tg 122\par \par Labs 12/2/21 \par LDL 45, HDL 70, Tg 115\par Gluc 141, A1c 5.7\par Cr 0.63, GFR 96\par \par Labs 3/8/22\par urine alb/Cr neg\par LDL 66, HDL 76, Tg 124\par Gluc 142, A1c 6.3\par TSH 1.22\par B12 762\par CBC wnl

## 2022-03-10 NOTE — REVIEW OF SYSTEMS
[Recent Weight Gain (___ Lbs)] : recent weight gain: [unfilled] lbs [Nocturia] : nocturia [As Noted in HPI] : as noted in HPI [Blurred Vision] : no blurred vision [Chest Pain] : no chest pain [Shortness Of Breath] : no shortness of breath [Nausea] : no nausea [Abdominal Pain] : no abdominal pain [Polyuria] : no polyuria [Polydipsia] : no polydipsia

## 2022-06-15 ENCOUNTER — RX RENEWAL (OUTPATIENT)
Age: 62
End: 2022-06-15

## 2022-06-16 ENCOUNTER — RESULT CHARGE (OUTPATIENT)
Age: 62
End: 2022-06-16

## 2022-06-16 ENCOUNTER — APPOINTMENT (OUTPATIENT)
Dept: ENDOCRINOLOGY | Facility: CLINIC | Age: 62
End: 2022-06-16
Payer: COMMERCIAL

## 2022-06-16 VITALS
HEIGHT: 66 IN | SYSTOLIC BLOOD PRESSURE: 128 MMHG | DIASTOLIC BLOOD PRESSURE: 72 MMHG | BODY MASS INDEX: 26.84 KG/M2 | WEIGHT: 167 LBS | HEART RATE: 87 BPM

## 2022-06-16 LAB — GLUCOSE BLDC GLUCOMTR-MCNC: 134

## 2022-06-16 PROCEDURE — 82962 GLUCOSE BLOOD TEST: CPT

## 2022-06-16 PROCEDURE — 99214 OFFICE O/P EST MOD 30 MIN: CPT | Mod: 25

## 2022-06-16 RX ORDER — NAPROXEN 500 MG/1
500 TABLET ORAL
Qty: 28 | Refills: 0 | Status: DISCONTINUED | COMMUNITY
Start: 2022-01-10 | End: 2022-06-16

## 2022-06-16 RX ORDER — METFORMIN ER 500 MG 500 MG/1
500 TABLET ORAL DAILY
Qty: 270 | Refills: 1 | Status: DISCONTINUED | COMMUNITY
Start: 2018-04-13 | End: 2022-06-16

## 2022-06-16 NOTE — DATA REVIEWED
[FreeTextEntry1] : Thyroid FNA 2017 LMP and LLP nodules - benign follicular nodules\par \par Thyroid sono 5/2018 enlarged thyroid with bilateral thyroid nodules -  minimal changes\par \par Thyroid sono 5/9/19 normal thyroid size, mildly heterogenous, multiple thyroid nodules stable/decreased in size\par RLP nodule 9x8x5 mm\par RMP nodule 8x4x4 mm\par LMP nodule 10x9x9 mm\par LMP nodule 8x6x5 mm\par LMP nodule [id number] mm\par \par Thyroid sono 6/17/20\par RMp nodule 7x3x5 mm - hypoechoic, stable\par RLP nodule 10x6x9 mm - heterogeneous, stable\par LMP nodule 10x7x8 mm heterogeneous, stable, benign FNA in 2017\par LLP nodule [id number] mm heterogeneous nodule, w internal vascularity, stable. Benign FNA in 2017\par LMLP nodule 6x4x7 mm - hypoechoic, stable\par \par Thyroid sonogram 7/8/21\par RMP bilobed colloid cyst 4x4x3 mm \par RLP posterior hypoechoic nodule 9x6x8 mm\par RLP anterior colloid cyst 5x5x5 mm\par LUMP hypoechoic nodule 6x9x8 mm -stable\par LUMP hypoechoic nodule 8x6x6 mm - new\par LLP hypoechoic nodule [id number] mm - stable but increased calcifications, radiology rec FNA biopsy\par \par Thyroid FNA biopsy 8/9/21\par Left lower pole thyroid nodules - benign follicular nodule, consistent with nodular goiter and posthemorrhagic cystic change. cat 2\par ==================================================\par Labs 1/2019\par TSH 0.73\par A1c 6.6%\par urine microalb/Cr 5\par \par Labs 5/16/19\par LDL 50, HDL 67, Tg 150\par A1c 6.5%\par \par Labs 12/10/19\par Gluc 168, A1c 7%\par Cr 0.70, GFR 94\par urine microalb/Cr 3\par LDL 56, HDl 81, tg 132\par \par Labs 6/1/20 \par Gluc 160, A1c 7%\par Cr 0.72, GFR 91\par urine microalb/Cr 8\par LDL 64, HDL 73, Tg 148\par TSH 0.86\par B12 777\par \par Labs 10/26/20\par LDL 62, HDL 73, Tg 164\par Gluc 153, A1c 7.2\par Cr 0.72, GFR 90\par \par Labs 2/10/21\par urine alb/Cr 3\par Tg 150, HDL 73, LDL 45\par Gluc 154, A1c 7.2\par Cr 0.67, GFR 95\par TSH 0.82\par B12 602\par \par Labs 6/1/21\par LDL 46, HDL 65, Tg 111\par Gluc 159, A1c 7.2\par Cr 0.65, GFR 96\par \par Labs 9/7/21\par Gluc 112, A1c 6%\par Cr 0.61, GFR 97\par LDL 45, HDL 56, Tg 122\par \par Labs 12/2/21 \par LDL 45, HDL 70, Tg 115\par Gluc 141, A1c 5.7\par Cr 0.63, GFR 96\par \par Labs 3/8/22\par urine alb/Cr neg\par LDL 66, HDL 76, Tg 124\par Gluc 142, A1c 6.3\par TSH 1.22\par B12 762\par CBC wnl\par \par Labs 6/13/22\par LDL 71, HDL 77, Tg 87\par Gluc 136, A1c 6.2\par Cr 0.63, GFR 96

## 2022-06-16 NOTE — PHYSICAL EXAM
[Alert] : alert [No Acute Distress] : no acute distress [No LAD] : no lymphadenopathy [Normal S1, S2] : normal S1 and S2 [Normal Rate] : heart rate was normal [Not Tender] : non-tender [Soft] : abdomen soft [Oriented x3] : oriented to person, place, and time [Normal Affect] : the affect was normal [Normal Insight/Judgement] : insight and judgment were intact [Normal Mood] : the mood was normal [de-identified] : Left thyroid nodule

## 2022-06-16 NOTE — HISTORY OF PRESENT ILLNESS
[FreeTextEntry1] : Interval Hx: recent stress, left hip pain needs steroid injection but worried about hyperglycemia. saw ENT for voice changes - given PPI and nasal spray and will see voice specialist\par \par 1. T2DM dx 2009 on routine blood test. Fasting hyperglycemia. not watching diet\par Past meds: Farxiga caused yeast infections. MFN ER caused GI distress\par \par Current DM meds: Ozempic 1 mg weekly - tolerating\par \par SMBG: not testing\par \par Complications:\par 11/2021 eye exam no DR\par (+) neuropathy ?diabetes ?radiculopathy - following with neuro, on gabapentin and amitryptilline\par 2022 neg urine microalb/cr\par \par 2. Thyroid nodules - no thyroid meds, denies anterior neck pain, dysphagia. (+) chronic voice changes (see above)\par 3. Hyperlipidemia - on rosuvastatin 10 mg daily\par

## 2022-06-16 NOTE — ASSESSMENT
[FreeTextEntry1] : 1. T2DM without comp - not testing, A1c 6.2%\par - cont Ozempic\par - cont lifestyle changes\par - repeat A1c 3 months\par \par 2. stable, asymptomatic multiple thyroid nodules, benign FNA in past - repeat thyroid sono summer 2022\par \par 3. HLD - controlled, cont statin, repeat lipids 3 months\par \par 4. Obesity, now overweight with weight gain, appetite increased, increase Ozempic and cont lifestyle changes\par \par 5. to have steroid injection, suggest MFN BID for 1-3 days post injection, increase PO water and watch diet\par \par 6. has yearly bone density screening with PCP

## 2022-06-16 NOTE — REVIEW OF SYSTEMS
[Recent Weight Gain (___ Lbs)] : recent weight gain: [unfilled] lbs [Blurred Vision] : no blurred vision [Dysphagia] : no dysphagia [Neck Pain] : no neck pain [Dysphonia] : no dysphonia [Chest Pain] : no chest pain [Shortness Of Breath] : no shortness of breath [SOB on Exertion] : no shortness of breath on exertion [Nausea] : no nausea [Abdominal Pain] : no abdominal pain [Polyuria] : no polyuria [Nocturia] : no nocturia [Pain/Numbness of Digits] : no pain/numbness of digits [Depression] : no depression

## 2022-07-15 ENCOUNTER — TRANSCRIPTION ENCOUNTER (OUTPATIENT)
Age: 62
End: 2022-07-15

## 2022-07-21 ENCOUNTER — RX RENEWAL (OUTPATIENT)
Age: 62
End: 2022-07-21

## 2022-08-17 ENCOUNTER — APPOINTMENT (OUTPATIENT)
Dept: FAMILY MEDICINE | Facility: CLINIC | Age: 62
End: 2022-08-17

## 2022-08-17 VITALS
HEART RATE: 74 BPM | OXYGEN SATURATION: 98 % | HEIGHT: 66 IN | SYSTOLIC BLOOD PRESSURE: 130 MMHG | WEIGHT: 164 LBS | DIASTOLIC BLOOD PRESSURE: 72 MMHG | BODY MASS INDEX: 26.36 KG/M2 | TEMPERATURE: 97.2 F | RESPIRATION RATE: 16 BRPM

## 2022-08-17 DIAGNOSIS — R49.9 UNSPECIFIED VOICE AND RESONANCE DISORDER: ICD-10-CM

## 2022-08-17 DIAGNOSIS — R07.9 CHEST PAIN, UNSPECIFIED: ICD-10-CM

## 2022-08-17 DIAGNOSIS — Z79.899 OTHER LONG TERM (CURRENT) DRUG THERAPY: ICD-10-CM

## 2022-08-17 DIAGNOSIS — Z87.09 PERSONAL HISTORY OF OTHER DISEASES OF THE RESPIRATORY SYSTEM: ICD-10-CM

## 2022-08-17 PROCEDURE — 36415 COLL VENOUS BLD VENIPUNCTURE: CPT

## 2022-08-17 PROCEDURE — 99214 OFFICE O/P EST MOD 30 MIN: CPT | Mod: 25

## 2022-08-17 NOTE — COUNSELING
[Engage in a relaxing activity] : Engage in a relaxing activity [Encouraged to use exercise tracking device] : Encouraged to use exercise tracking device [None] : None [Good understanding] : Patient has a good understanding of lifestyle changes and steps needed to achieve self management goal

## 2022-08-17 NOTE — HISTORY OF PRESENT ILLNESS
[FreeTextEntry1] : Pt is a 61 yo F her for a follow up of med refill  [de-identified] : Pt is a 61 yo F her for a follow up of med refill. PMHx sig for RADHA, T2DM, HLD.

## 2022-08-18 ENCOUNTER — OUTPATIENT (OUTPATIENT)
Dept: OUTPATIENT SERVICES | Facility: HOSPITAL | Age: 62
LOS: 1 days | End: 2022-08-18
Payer: COMMERCIAL

## 2022-08-18 DIAGNOSIS — R49.0 DYSPHONIA: ICD-10-CM

## 2022-08-18 LAB
ALBUMIN SERPL ELPH-MCNC: 4.1 G/DL
ALP BLD-CCNC: 74 U/L
ALT SERPL-CCNC: 9 U/L
ANION GAP SERPL CALC-SCNC: 16 MMOL/L
AST SERPL-CCNC: 22 U/L
BILIRUB SERPL-MCNC: 0.5 MG/DL
BUN SERPL-MCNC: 16 MG/DL
CALCIUM SERPL-MCNC: 9.6 MG/DL
CHLORIDE SERPL-SCNC: 103 MMOL/L
CHOLEST SERPL-MCNC: 152 MG/DL
CO2 SERPL-SCNC: 22 MMOL/L
CREAT SERPL-MCNC: 0.66 MG/DL
EGFR: 99 ML/MIN/1.73M2
ESTIMATED AVERAGE GLUCOSE: 134 MG/DL
GLUCOSE SERPL-MCNC: 209 MG/DL
HBA1C MFR BLD HPLC: 6.3 %
HDLC SERPL-MCNC: 75 MG/DL
LDLC SERPL CALC-MCNC: 58 MG/DL
NONHDLC SERPL-MCNC: 77 MG/DL
POTASSIUM SERPL-SCNC: 4.6 MMOL/L
PROT SERPL-MCNC: 6.2 G/DL
SODIUM SERPL-SCNC: 141 MMOL/L
T3 SERPL-MCNC: 112 NG/DL
TRIGL SERPL-MCNC: 96 MG/DL
TSH SERPL-ACNC: 0.46 UIU/ML

## 2022-08-18 PROCEDURE — 74230 X-RAY XM SWLNG FUNCJ C+: CPT

## 2022-08-18 PROCEDURE — 92611 MOTION FLUOROSCOPY/SWALLOW: CPT

## 2022-08-18 PROCEDURE — 74230 X-RAY XM SWLNG FUNCJ C+: CPT | Mod: 26

## 2022-08-18 NOTE — SWALLOW VFSS/MBS ASSESSMENT ADULT - ORAL PHASE
intermittent piecemeal deglutition noted/within functional limits within functional limits intermittent piecemeal deglutition/within functional limits

## 2022-08-18 NOTE — SWALLOW VFSS/MBS ASSESSMENT ADULT - ROSENBEK'S PENETRATION ASPIRATION SCALE
(1) no aspiration, contrast does not enter airway in 4/8 trials; 2=contrast enters the airway, remains above the vocal cords, no residue remains (penetration) in 3/8 trials; 3= contrast remains above the vocal cords, visible residue remains (penetration) in 1 instance of large consecutive cup sips/(1) no aspiration, contrast does not enter airway

## 2022-08-18 NOTE — SWALLOW VFSS/MBS ASSESSMENT ADULT - RECOMMENDED CONSISTENCY
Regular diet w/ small single sips of thin liquids. Pt may implement chin tuck w/ thin liquids as needed to eliminate instances of penetration & reduce irritation 2' sensory response.

## 2022-08-18 NOTE — SWALLOW VFSS/MBS ASSESSMENT ADULT - DIAGNOSTIC IMPRESSIONS
Pt's jerod-pharyngeal swallow objectively assessed to be WFL. Oral stage characterized by adequate acceptance, mastication and A-P transit of all trials. No anterior loss or oral residue observed. Pharyngeal swallow marked by inconsistent penetration above the vocal cords w/ full retrieval w/ +sensory response (throat clear/cough) w/ thin liquids. Although instances of penetration w/ thin liquids are clinically judged to be functional, pt w/ increased sensitivity resulting in continuos throat clear/cough, therefore, chin tuck implemented which eliminated instances of penetration. No  penetration/aspiration visualized w/ puree and/or regular solid consistency. Mild-moderate valleculae and posterior pharyngeal wall residue observed w/ regular solids which reduced w/ subsequent swallow. Overall functional hyo-laryngeal elevation/excursion & lower airway protection noted.

## 2022-08-19 LAB
BASOPHILS # BLD AUTO: 0.04 K/UL
BASOPHILS NFR BLD AUTO: 0.7 %
EOSINOPHIL # BLD AUTO: 0.12 K/UL
EOSINOPHIL NFR BLD AUTO: 2 %
HCT VFR BLD CALC: 43.8 %
HGB BLD-MCNC: 14.3 G/DL
IMM GRANULOCYTES NFR BLD AUTO: 0.2 %
LYMPHOCYTES # BLD AUTO: 1.74 K/UL
LYMPHOCYTES NFR BLD AUTO: 28.6 %
MAN DIFF?: NORMAL
MCHC RBC-ENTMCNC: 29.5 PG
MCHC RBC-ENTMCNC: 32.6 GM/DL
MCV RBC AUTO: 90.5 FL
MONOCYTES # BLD AUTO: 0.47 K/UL
MONOCYTES NFR BLD AUTO: 7.7 %
NEUTROPHILS # BLD AUTO: 3.7 K/UL
NEUTROPHILS NFR BLD AUTO: 60.8 %
PLATELET # BLD AUTO: 265 K/UL
RBC # BLD: 4.84 M/UL
RBC # FLD: 12.6 %
WBC # FLD AUTO: 6.08 K/UL

## 2022-09-20 LAB
HBA1C MFR BLD HPLC: 6
LDLC SERPL DIRECT ASSAY-MCNC: 51

## 2022-09-21 ENCOUNTER — RESULT CHARGE (OUTPATIENT)
Age: 62
End: 2022-09-21

## 2022-09-21 ENCOUNTER — APPOINTMENT (OUTPATIENT)
Dept: ENDOCRINOLOGY | Facility: CLINIC | Age: 62
End: 2022-09-21

## 2022-09-21 VITALS
HEIGHT: 66 IN | SYSTOLIC BLOOD PRESSURE: 126 MMHG | HEART RATE: 70 BPM | BODY MASS INDEX: 26.52 KG/M2 | WEIGHT: 165 LBS | DIASTOLIC BLOOD PRESSURE: 70 MMHG

## 2022-09-21 LAB — GLUCOSE BLDC GLUCOMTR-MCNC: 172

## 2022-09-21 PROCEDURE — 99214 OFFICE O/P EST MOD 30 MIN: CPT | Mod: 25

## 2022-09-21 PROCEDURE — 82962 GLUCOSE BLOOD TEST: CPT

## 2022-09-21 RX ORDER — TIZANIDINE 4 MG/1
4 TABLET ORAL
Qty: 30 | Refills: 0 | Status: DISCONTINUED | COMMUNITY
Start: 2022-04-14 | End: 2022-09-21

## 2022-09-21 NOTE — ASSESSMENT
[FreeTextEntry1] : 1. T2DM without comp - not testing, A1c 6%, tolerating GLP1 agonist\par - cont Ozempic 2 mg weekly\par - cont lifestyle changes\par - repeat A1c 3 months\par \par 2. stable, asymptomatic multiple thyroid nodules, benign FNA in past - repeat thyroid sono summer 2023\par \par 3. HLD - controlled, cont statin, repeat lipids 3 months\par \par 4. Overweight weight stable, cont Ozempic, watch diet

## 2022-09-21 NOTE — DATA REVIEWED
[FreeTextEntry1] : Thyroid FNA 2017 LMP and LLP nodules - benign follicular nodules\par \par Thyroid sono 5/2018 enlarged thyroid with bilateral thyroid nodules -  minimal changes\par \par Thyroid sono 5/9/19 normal thyroid size, mildly heterogenous, multiple thyroid nodules stable/decreased in size\par RLP nodule 9x8x5 mm\par RMP nodule 8x4x4 mm\par LMP nodule 10x9x9 mm\par LMP nodule 8x6x5 mm\par LMP nodule 54r10a2 mm\par \par Thyroid sono 6/17/20\par RMp nodule 7x3x5 mm - hypoechoic, stable\par RLP nodule 10x6x9 mm - heterogeneous, stable\par LMP nodule 10x7x8 mm heterogeneous, stable, benign FNA in 2017\par LLP nodule [id number] mm heterogeneous nodule, w internal vascularity, stable. Benign FNA in 2017\par LMLP nodule 6x4x7 mm - hypoechoic, stable\par \par Thyroid sonogram 7/8/21\par RMP bilobed colloid cyst 4x4x3 mm \par RLP posterior hypoechoic nodule 9x6x8 mm\par RLP anterior colloid cyst 5x5x5 mm\par LUMP hypoechoic nodule 6x9x8 mm -stable\par LUMP hypoechoic nodule 8x6x6 mm - new\par LLP hypoechoic nodule [id number] mm - stable but increased calcifications, radiology rec FNA biopsy\par \par Thyroid FNA biopsy 8/9/21\par Left lower pole thyroid nodules - benign follicular nodule, consistent with nodular goiter and posthemorrhagic cystic change. cat 2\par \par Thyriod sonogram 7/2022 stable nodules\par ==================================================\par Labs 1/2019\par TSH 0.73\par A1c 6.6%\par urine microalb/Cr 5\par \par Labs 5/16/19\par LDL 50, HDL 67, Tg 150\par A1c 6.5%\par \par Labs 12/10/19\par Gluc 168, A1c 7%\par Cr 0.70, GFR 94\par urine microalb/Cr 3\par LDL 56, HDl 81, tg 132\par \par Labs 6/1/20 \par Gluc 160, A1c 7%\par Cr 0.72, GFR 91\par urine microalb/Cr 8\par LDL 64, HDL 73, Tg 148\par TSH 0.86\par B12 777\par \par Labs 10/26/20\par LDL 62, HDL 73, Tg 164\par Gluc 153, A1c 7.2\par Cr 0.72, GFR 90\par \par Labs 2/10/21\par urine alb/Cr 3\par Tg 150, HDL 73, LDL 45\par Gluc 154, A1c 7.2\par Cr 0.67, GFR 95\par TSH 0.82\par B12 602\par \par Labs 6/1/21\par LDL 46, HDL 65, Tg 111\par Gluc 159, A1c 7.2\par Cr 0.65, GFR 96\par \par Labs 9/7/21\par Gluc 112, A1c 6%\par Cr 0.61, GFR 97\par LDL 45, HDL 56, Tg 122\par \par Labs 12/2/21 \par LDL 45, HDL 70, Tg 115\par Gluc 141, A1c 5.7\par Cr 0.63, GFR 96\par \par Labs 3/8/22\par urine alb/Cr neg\par LDL 66, HDL 76, Tg 124\par Gluc 142, A1c 6.3\par TSH 1.22\par B12 762\par CBC wnl\par \par Labs 6/13/22\par LDL 71, HDL 77, Tg 87\par Gluc 136, A1c 6.2\par Cr 0.63, GFR 96\par \par Labs 9/14/22\par LDL 51, HDL 82, Tg 120\par Gluc 117, A1c 6%\par Cr 0.66, GFR 99

## 2022-09-21 NOTE — PHYSICAL EXAM
[Alert] : alert [No Acute Distress] : no acute distress [No LAD] : no lymphadenopathy [Normal S1, S2] : normal S1 and S2 [Normal Rate] : heart rate was normal [Not Tender] : non-tender [Soft] : abdomen soft [Oriented x3] : oriented to person, place, and time [Normal Affect] : the affect was normal [Normal Insight/Judgement] : insight and judgment were intact [Normal Mood] : the mood was normal [No Edema] : no peripheral edema [Foot Ulcers] : no foot ulcers [2+] : 2+ in the dorsalis pedis [Vibration Dec.] : normal vibratory sensation at the level of the toes [Diminished Throughout Both Feet] : normal tactile sensation with monofilament testing throughout both feet [de-identified] : Left thyroid nodule

## 2022-09-21 NOTE — REVIEW OF SYSTEMS
[Recent Weight Gain (___ Lbs)] : no recent weight gain [Recent Weight Loss (___ Lbs)] : no recent weight loss [Blurred Vision] : no blurred vision [Dysphagia] : no dysphagia [Neck Pain] : no neck pain [Dysphonia] : no dysphonia [Chest Pain] : no chest pain [Shortness Of Breath] : no shortness of breath [SOB on Exertion] : no shortness of breath on exertion [Nausea] : no nausea [Constipation] : no constipation [Abdominal Pain] : no abdominal pain [Polyuria] : no polyuria [Nocturia] : no nocturia [Pain/Numbness of Digits] : no pain/numbness of digits [Depression] : no depression

## 2022-09-21 NOTE — HISTORY OF PRESENT ILLNESS
[FreeTextEntry1] : Interval Hx: saw ENT, GI, allergist for constant throat clearing - has some swallowing issue, has post nasal drip and reflux\par \par 1. T2DM dx 2009 on routine blood test. improved w meds, has been on/off diet\par Past meds: Farxiga caused yeast infections. MFN ER caused GI distress\par Current DM meds: Ozempic 2 mg weekly - tolerating\par SMBG: not testing\par \par Complications:\par 11/2021 eye exam no DR\par (+) neuropathy ?diabetes ?radiculopathy - following with neuro, on gabapentin and amitryptilline\par 2022 neg urine microalb/cr\par \par 2. Thyroid nodules - no thyroid meds, denies anterior neck pain, dysphagia. (+) chronic voice changes (see above)\par 3. Hyperlipidemia - on rosuvastatin 10 mg daily\par

## 2022-11-18 ENCOUNTER — RX RENEWAL (OUTPATIENT)
Age: 62
End: 2022-11-18

## 2022-11-22 ENCOUNTER — OFFICE (OUTPATIENT)
Dept: URBAN - METROPOLITAN AREA CLINIC 115 | Facility: CLINIC | Age: 62
Setting detail: OPHTHALMOLOGY
End: 2022-11-22
Payer: COMMERCIAL

## 2022-11-22 DIAGNOSIS — Z79.84: ICD-10-CM

## 2022-11-22 DIAGNOSIS — E11.3293: ICD-10-CM

## 2022-11-22 DIAGNOSIS — H25.13: ICD-10-CM

## 2022-11-22 DIAGNOSIS — H52.03: ICD-10-CM

## 2022-11-22 DIAGNOSIS — H40.033: ICD-10-CM

## 2022-11-22 PROBLEM — H57.13 OCULAR PAIN; BOTH EYES: Status: ACTIVE | Noted: 2022-11-22

## 2022-11-22 PROCEDURE — 92015 DETERMINE REFRACTIVE STATE: CPT | Performed by: OPHTHALMOLOGY

## 2022-11-22 PROCEDURE — 92202 OPSCPY EXTND ON/MAC DRAW: CPT | Performed by: OPHTHALMOLOGY

## 2022-11-22 PROCEDURE — 92014 COMPRE OPH EXAM EST PT 1/>: CPT | Performed by: OPHTHALMOLOGY

## 2022-11-22 ASSESSMENT — REFRACTION_AUTOREFRACTION
OS_CYLINDER: -1.25
OD_SPHERE: +1.50
OD_AXIS: 037
OS_AXIS: 157
OD_CYLINDER: -0.75
OS_SPHERE: +2.00

## 2022-11-22 ASSESSMENT — SPHEQUIV_DERIVED
OS_SPHEQUIV: 1.375
OD_SPHEQUIV: 1.25
OS_SPHEQUIV: 1.375
OS_SPHEQUIV: 0.75
OD_SPHEQUIV: 0.625
OD_SPHEQUIV: 1.125

## 2022-11-22 ASSESSMENT — REFRACTION_MANIFEST
OD_AXIS: 030
OD_CYLINDER: -1.25
OS_CYLINDER: -1.50
OD_VA1: 20/25-1
OD_VA1: 20/20-
OS_SPHERE: +2.00
OS_VA1: 20/20-
OD_AXIS: 008
OS_ADD: +2.50
OS_CYLINDER: -1.25
OD_ADD: +2.50
OD_ADD: +2.50
OU_VA: 20/20-
OS_ADD: +2.50
OS_VA1: 20/20-1
OD_CYLINDER: -1.00
OS_AXIS: 150
OD_SPHERE: +1.25
OS_AXIS: 158
OD_SPHERE: +1.75
OS_SPHERE: +1.50

## 2022-11-22 ASSESSMENT — REFRACTION_CURRENTRX
OS_ADD: +2.50
OS_VPRISM_DIRECTION: PROGS
OD_CYLINDER: -1.00
OD_ADD: +2.50
OS_AXIS: 150
OD_AXIS: 008
OD_OVR_VA: 20/
OS_CYLINDER: -1.25
OD_VPRISM_DIRECTION: PROGS
OS_SPHERE: +1.50
OD_SPHERE: +1.00
OS_OVR_VA: 20/

## 2022-11-22 ASSESSMENT — CONFRONTATIONAL VISUAL FIELD TEST (CVF)
OD_FINDINGS: FULL
OS_FINDINGS: FULL

## 2022-11-22 ASSESSMENT — VISUAL ACUITY
OS_BCVA: 20/30+1
OD_BCVA: 20/25-1

## 2022-11-22 ASSESSMENT — TONOMETRY
OD_IOP_MMHG: 18
OS_IOP_MMHG: 16

## 2022-11-26 ENCOUNTER — RX RENEWAL (OUTPATIENT)
Age: 62
End: 2022-11-26

## 2023-01-10 ENCOUNTER — APPOINTMENT (OUTPATIENT)
Dept: FAMILY MEDICINE | Facility: CLINIC | Age: 63
End: 2023-01-10
Payer: COMMERCIAL

## 2023-01-10 ENCOUNTER — NON-APPOINTMENT (OUTPATIENT)
Age: 63
End: 2023-01-10

## 2023-01-10 VITALS
RESPIRATION RATE: 12 BRPM | BODY MASS INDEX: 27.16 KG/M2 | HEIGHT: 66 IN | TEMPERATURE: 98.1 F | WEIGHT: 169 LBS | SYSTOLIC BLOOD PRESSURE: 124 MMHG | HEART RATE: 79 BPM | OXYGEN SATURATION: 98 % | DIASTOLIC BLOOD PRESSURE: 80 MMHG

## 2023-01-10 LAB — LDLC SERPL DIRECT ASSAY-MCNC: 69

## 2023-01-10 PROCEDURE — 93000 ELECTROCARDIOGRAM COMPLETE: CPT

## 2023-01-10 PROCEDURE — 99396 PREV VISIT EST AGE 40-64: CPT | Mod: 25

## 2023-01-10 PROCEDURE — 36415 COLL VENOUS BLD VENIPUNCTURE: CPT

## 2023-01-10 NOTE — PLAN
[FreeTextEntry1] : 63 yo F with PMHx of RADHA, T2DM, HLD presents for a CPE.\par \par Care plan reviewed\par Labs reviewed\par Meds reviewed\par EKG normal\par Referall for cardiology given

## 2023-01-10 NOTE — HEALTH RISK ASSESSMENT
[Good] : ~his/her~  mood as  good [Former] : Former [No] : In the past 12 months have you used drugs other than those required for medical reasons? No [Patient reported mammogram was normal] : Patient reported mammogram was normal [Patient reported PAP Smear was normal] : Patient reported PAP Smear was normal [Patient reported bone density results were abnormal] : Patient reported bone density results were abnormal [Patient reported colonoscopy was normal] : Patient reported colonoscopy was normal [YearQuit] : 2008 [MammogramDate] : 09/22 [PapSmearDate] : 10/22 [BoneDensityDate] : 01/21 [BoneDensityComments] : osteopenia [ColonoscopyDate] : 01/19

## 2023-01-10 NOTE — HISTORY OF PRESENT ILLNESS
[FreeTextEntry1] : CPE [de-identified] : 61 yo F with PMHx of RADHA, T2DM, HLD presents for a CPE. She endorses frequent heart palpitations x 6-8 months. She also endorses dizziness episodes x 6-8 months. These episodes occur when she gets up from sitting to standing and when she moves her head quickly. She denies any chest pain, SOB, headaches, N/V/D, fevers, chills, abdominal pain.

## 2023-01-11 ENCOUNTER — APPOINTMENT (OUTPATIENT)
Dept: ENDOCRINOLOGY | Facility: CLINIC | Age: 63
End: 2023-01-11
Payer: COMMERCIAL

## 2023-01-11 ENCOUNTER — RESULT CHARGE (OUTPATIENT)
Age: 63
End: 2023-01-11

## 2023-01-11 VITALS
WEIGHT: 169 LBS | DIASTOLIC BLOOD PRESSURE: 84 MMHG | SYSTOLIC BLOOD PRESSURE: 126 MMHG | HEIGHT: 66 IN | BODY MASS INDEX: 27.16 KG/M2 | HEART RATE: 84 BPM

## 2023-01-11 LAB
ALBUMIN SERPL ELPH-MCNC: 4.5 G/DL
ALP BLD-CCNC: 73 U/L
ALT SERPL-CCNC: 14 U/L
ANION GAP SERPL CALC-SCNC: 14 MMOL/L
AST SERPL-CCNC: 24 U/L
BASOPHILS # BLD AUTO: 0.03 K/UL
BASOPHILS NFR BLD AUTO: 0.5 %
BILIRUB SERPL-MCNC: 0.4 MG/DL
BUN SERPL-MCNC: 17 MG/DL
CALCIUM SERPL-MCNC: 9.5 MG/DL
CHLORIDE SERPL-SCNC: 102 MMOL/L
CHOLEST SERPL-MCNC: 172 MG/DL
CO2 SERPL-SCNC: 26 MMOL/L
CREAT SERPL-MCNC: 0.68 MG/DL
EGFR: 98 ML/MIN/1.73M2
EOSINOPHIL # BLD AUTO: 0.16 K/UL
EOSINOPHIL NFR BLD AUTO: 2.7 %
GLUCOSE BLDC GLUCOMTR-MCNC: 198
GLUCOSE SERPL-MCNC: 141 MG/DL
HBA1C MFR BLD HPLC: 6.1
HCT VFR BLD CALC: 45.7 %
HDLC SERPL-MCNC: 84 MG/DL
HGB BLD-MCNC: 14.9 G/DL
IMM GRANULOCYTES NFR BLD AUTO: 0.2 %
LDLC SERPL CALC-MCNC: 60 MG/DL
LYMPHOCYTES # BLD AUTO: 1.65 K/UL
LYMPHOCYTES NFR BLD AUTO: 27.6 %
MAN DIFF?: NORMAL
MCHC RBC-ENTMCNC: 29.6 PG
MCHC RBC-ENTMCNC: 32.6 GM/DL
MCV RBC AUTO: 90.9 FL
MONOCYTES # BLD AUTO: 0.41 K/UL
MONOCYTES NFR BLD AUTO: 6.9 %
NEUTROPHILS # BLD AUTO: 3.72 K/UL
NEUTROPHILS NFR BLD AUTO: 62.1 %
NONHDLC SERPL-MCNC: 88 MG/DL
PLATELET # BLD AUTO: 238 K/UL
POTASSIUM SERPL-SCNC: 4.6 MMOL/L
PROT SERPL-MCNC: 6.5 G/DL
RBC # BLD: 5.03 M/UL
RBC # FLD: 12.5 %
SODIUM SERPL-SCNC: 142 MMOL/L
TRIGL SERPL-MCNC: 141 MG/DL
WBC # FLD AUTO: 5.98 K/UL

## 2023-01-11 PROCEDURE — 83036 HEMOGLOBIN GLYCOSYLATED A1C: CPT | Mod: QW

## 2023-01-11 PROCEDURE — 99214 OFFICE O/P EST MOD 30 MIN: CPT | Mod: 25

## 2023-01-11 PROCEDURE — 82962 GLUCOSE BLOOD TEST: CPT

## 2023-01-11 NOTE — HISTORY OF PRESENT ILLNESS
[FreeTextEntry1] : Interval Hx: palpitations, normal EKG, to see cardio later this week\par \par 1. T2DM dx 2009 on routine blood test. improved w meds, has been on/off diet\par Past meds: Farxiga caused yeast infections. MFN ER caused GI distress\par Current DM meds: Ozempic 2 mg weekly - tolerating\par SMBG: not testing\par \par Complications:\par 11/2022 eye exam mild NPDR\par (+) neuropathy ?diabetes ?radiculopathy - following with neuro, on gabapentin and amitryptilline\par 2022 neg urine microalb/cr\par \par 2. Thyroid nodules - no thyroid meds, denies anterior neck pain, dysphagia. (+) chronic voice changes due to reflux\par 3. Hyperlipidemia - on rosuvastatin 10 mg daily\par

## 2023-01-11 NOTE — REVIEW OF SYSTEMS
[Nocturia] : nocturia [Recent Weight Gain (___ Lbs)] : no recent weight gain [Recent Weight Loss (___ Lbs)] : no recent weight loss [Blurred Vision] : no blurred vision [Dysphagia] : no dysphagia [Neck Pain] : no neck pain [Dysphonia] : no dysphonia [Chest Pain] : no chest pain [Shortness Of Breath] : no shortness of breath [SOB on Exertion] : no shortness of breath on exertion [Nausea] : no nausea [Constipation] : no constipation [Abdominal Pain] : no abdominal pain [Polyuria] : no polyuria [Pain/Numbness of Digits] : no pain/numbness of digits [Depression] : no depression

## 2023-01-11 NOTE — ASSESSMENT
[FreeTextEntry1] : 1. T2DM with mild NPDR - not testing, A1c 6.1%, tolerating GLP1 agonist\par - cont Ozempic 2 mg weekly, Rx sent\par - cont lifestyle changes\par - repeat A1c 3 months\par - cont ophthal follow up\par \par 2. stable, asymptomatic multiple thyroid nodules, benign FNA in past - repeat thyroid sono summer 2023\par \par 3. HLD - controlled, cont statin, repeat lipids 3 months\par \par 4. Overweight weight stable, cont Ozempic, watch diet

## 2023-01-11 NOTE — DATA REVIEWED
[FreeTextEntry1] : Thyroid FNA 2017 LMP and LLP nodules - benign follicular nodules\par \par Thyroid sono 5/2018 enlarged thyroid with bilateral thyroid nodules -  minimal changes\par \par Thyroid sono 5/9/19 normal thyroid size, mildly heterogenous, multiple thyroid nodules stable/decreased in size\par RLP nodule 9x8x5 mm\par RMP nodule 8x4x4 mm\par LMP nodule 10x9x9 mm\par LMP nodule 8x6x5 mm\par LMP nodule [id number] mm\par \par Thyroid sono 6/17/20\par RMp nodule 7x3x5 mm - hypoechoic, stable\par RLP nodule 10x6x9 mm - heterogeneous, stable\par LMP nodule 10x7x8 mm heterogeneous, stable, benign FNA in 2017\par LLP nodule [id number] mm heterogeneous nodule, w internal vascularity, stable. Benign FNA in 2017\par LMLP nodule 6x4x7 mm - hypoechoic, stable\par \par Thyroid sonogram 7/8/21\par RMP bilobed colloid cyst 4x4x3 mm \par RLP posterior hypoechoic nodule 9x6x8 mm\par RLP anterior colloid cyst 5x5x5 mm\par LUMP hypoechoic nodule 6x9x8 mm -stable\par LUMP hypoechoic nodule 8x6x6 mm - new\par LLP hypoechoic nodule [id number] mm - stable but increased calcifications, radiology rec FNA biopsy\par \par Thyroid FNA biopsy 8/9/21\par Left lower pole thyroid nodules - benign follicular nodule, consistent with nodular goiter and posthemorrhagic cystic change. cat 2\par \par Thyriod sonogram 7/2022 stable nodules\par ==================================================\par Labs 1/2019\par TSH 0.73\par A1c 6.6%\par urine microalb/Cr 5\par \par Labs 5/16/19\par LDL 50, HDL 67, Tg 150\par A1c 6.5%\par \par Labs 12/10/19\par Gluc 168, A1c 7%\par Cr 0.70, GFR 94\par urine microalb/Cr 3\par LDL 56, HDl 81, tg 132\par \par Labs 6/1/20 \par Gluc 160, A1c 7%\par Cr 0.72, GFR 91\par urine microalb/Cr 8\par LDL 64, HDL 73, Tg 148\par TSH 0.86\par B12 777\par \par Labs 10/26/20\par LDL 62, HDL 73, Tg 164\par Gluc 153, A1c 7.2\par Cr 0.72, GFR 90\par \par Labs 2/10/21\par urine alb/Cr 3\par Tg 150, HDL 73, LDL 45\par Gluc 154, A1c 7.2\par Cr 0.67, GFR 95\par TSH 0.82\par B12 602\par \par Labs 6/1/21\par LDL 46, HDL 65, Tg 111\par Gluc 159, A1c 7.2\par Cr 0.65, GFR 96\par \par Labs 9/7/21\par Gluc 112, A1c 6%\par Cr 0.61, GFR 97\par LDL 45, HDL 56, Tg 122\par \par Labs 12/2/21 \par LDL 45, HDL 70, Tg 115\par Gluc 141, A1c 5.7\par Cr 0.63, GFR 96\par \par Labs 3/8/22\par urine alb/Cr neg\par LDL 66, HDL 76, Tg 124\par Gluc 142, A1c 6.3\par TSH 1.22\par B12 762\par CBC wnl\par \par Labs 6/13/22\par LDL 71, HDL 77, Tg 87\par Gluc 136, A1c 6.2\par Cr 0.63, GFR 96\par \par Labs 9/14/22\par LDL 51, HDL 82, Tg 120\par Gluc 117, A1c 6%\par Cr 0.66, GFR 99\par \par Labs 1/4/23 TSH 0.73

## 2023-01-13 ENCOUNTER — NON-APPOINTMENT (OUTPATIENT)
Age: 63
End: 2023-01-13

## 2023-01-13 ENCOUNTER — APPOINTMENT (OUTPATIENT)
Dept: CARDIOLOGY | Facility: CLINIC | Age: 63
End: 2023-01-13
Payer: COMMERCIAL

## 2023-01-13 VITALS
BODY MASS INDEX: 27 KG/M2 | OXYGEN SATURATION: 96 % | SYSTOLIC BLOOD PRESSURE: 122 MMHG | TEMPERATURE: 98.89 F | HEIGHT: 66 IN | WEIGHT: 168 LBS | HEART RATE: 96 BPM | DIASTOLIC BLOOD PRESSURE: 82 MMHG

## 2023-01-13 PROCEDURE — 93000 ELECTROCARDIOGRAM COMPLETE: CPT

## 2023-01-13 PROCEDURE — 99204 OFFICE O/P NEW MOD 45 MIN: CPT

## 2023-01-13 RX ORDER — AZELASTINE HYDROCHLORIDE AND FLUTICASONE PROPIONATE 137; 50 UG/1; UG/1
137-50 SPRAY, METERED NASAL
Qty: 23 | Refills: 0 | Status: DISCONTINUED | COMMUNITY
Start: 2022-12-06 | End: 2023-01-13

## 2023-01-13 RX ORDER — LANCETS
EACH MISCELLANEOUS
Qty: 2 | Refills: 1 | Status: DISCONTINUED | COMMUNITY
Start: 2019-10-25 | End: 2023-01-13

## 2023-01-13 RX ORDER — BLOOD SUGAR DIAGNOSTIC
STRIP MISCELLANEOUS TWICE DAILY
Qty: 200 | Refills: 3 | Status: DISCONTINUED | COMMUNITY
Start: 2019-10-25 | End: 2023-01-13

## 2023-01-13 NOTE — HISTORY OF PRESENT ILLNESS
[FreeTextEntry1] : 62F h/o anxiety, HLD, DM2, GERD, recent annual physical with PCP with complains of palpitations for 6-8 months with dizziness, refer for cardiology evaluation. \par \par Reports been having daily palpitations can be racing or skip beats, occurs several times per day, also dizziness can be separate from her palpitations, worst with head movement, no prior syncope; drinks 1-2 cups coffee, sleeping 7-8 hours. Baseline walking about 20 minutes, denies exertional shortness of breath or chest pain. \par \par Father with valve replacement age 70, committed suicide recently age 90; Aunts with stroke \par Former smoker quit 2008, smoked from age 17 to 2008\par No alcohol\par \par Recent lipid 1/2023- , , HDL 84, LDL 60\par A1c 6.1%\par TSH 0.76

## 2023-01-13 NOTE — DISCUSSION/SUMMARY
[FreeTextEntry1] : 62F h/o anxiety, HLD, DM2, GERD, recent annual physical with PCP with complains of palpitations for 6-8 months with dizziness, refer for cardiology evaluation. \par \par Chronic palpitations with overall normal EKG without ischemic abnormality, will obtain 72hrs Holter and check baseline Echocardiogram; dizziness worst with head movement, check carotid Doppler if negative then likely related to vertigo. No anginal complains, defer stress testing for now as risk factors are controlled and normal EKG. \par \par \par 1. Palpitations- await Holter with symptoms diary log and TTE. If normal suspect nonarrhythmic related. \par \par 2. Dizziness- pending carotid Duplex, if normal then recommend ENT eval. for vertigo. \par \par 3. DM2- at goal on current regimen. \par \par 4. HLD- LDL at goal <70 on rosuvastatin. \par \par \par \par Follow up as needed if the above tests are normal.  [EKG obtained to assist in diagnosis and management of assessed problem(s)] : EKG obtained to assist in diagnosis and management of assessed problem(s)

## 2023-01-20 ENCOUNTER — APPOINTMENT (OUTPATIENT)
Dept: CARDIOLOGY | Facility: CLINIC | Age: 63
End: 2023-01-20
Payer: COMMERCIAL

## 2023-01-20 PROCEDURE — 93880 EXTRACRANIAL BILAT STUDY: CPT

## 2023-01-20 PROCEDURE — 93306 TTE W/DOPPLER COMPLETE: CPT

## 2023-01-24 ENCOUNTER — APPOINTMENT (OUTPATIENT)
Dept: FAMILY MEDICINE | Facility: CLINIC | Age: 63
End: 2023-01-24
Payer: COMMERCIAL

## 2023-01-24 VITALS
BODY MASS INDEX: 27.48 KG/M2 | WEIGHT: 171 LBS | OXYGEN SATURATION: 98 % | HEART RATE: 97 BPM | TEMPERATURE: 98.4 F | HEIGHT: 66 IN | SYSTOLIC BLOOD PRESSURE: 130 MMHG | DIASTOLIC BLOOD PRESSURE: 84 MMHG | RESPIRATION RATE: 14 BRPM

## 2023-01-24 DIAGNOSIS — R00.2 PALPITATIONS: ICD-10-CM

## 2023-01-24 PROCEDURE — 99214 OFFICE O/P EST MOD 30 MIN: CPT

## 2023-01-24 NOTE — HISTORY OF PRESENT ILLNESS
[FreeTextEntry1] : follow up  [de-identified] : 63 yo F with PMHx of RADHA, T2DM, HLD presents for a follow up. She recently saw cardiology 2 weeks ago for a follow up on heart palpitations. Had Carotid and Echo

## 2023-01-24 NOTE — PLAN
[FreeTextEntry1] : 61 yo F with PMHx of RADHA, T2DM, HLD presents for a follow up.\par Neuro and ENT consult\par Care plan reviewed\par Labs reviewed\par Meds reviewed\par

## 2023-04-04 LAB
HBA1C MFR BLD HPLC: 6
LDLC SERPL DIRECT ASSAY-MCNC: 50
MICROALBUMIN/CREAT 24H UR-RTO: 3

## 2023-04-05 ENCOUNTER — APPOINTMENT (OUTPATIENT)
Dept: ENDOCRINOLOGY | Facility: CLINIC | Age: 63
End: 2023-04-05
Payer: COMMERCIAL

## 2023-04-05 ENCOUNTER — RESULT CHARGE (OUTPATIENT)
Age: 63
End: 2023-04-05

## 2023-04-05 VITALS
HEART RATE: 78 BPM | HEIGHT: 66 IN | BODY MASS INDEX: 27.32 KG/M2 | SYSTOLIC BLOOD PRESSURE: 132 MMHG | DIASTOLIC BLOOD PRESSURE: 74 MMHG | WEIGHT: 170 LBS

## 2023-04-05 LAB — GLUCOSE BLDC GLUCOMTR-MCNC: 140

## 2023-04-05 PROCEDURE — 99214 OFFICE O/P EST MOD 30 MIN: CPT | Mod: 25

## 2023-04-05 PROCEDURE — 82962 GLUCOSE BLOOD TEST: CPT

## 2023-04-05 NOTE — ASSESSMENT
[FreeTextEntry1] : 1. T2DM with mild NPDR - not testing, A1c 6%, tolerating GLP1 agonist\par - cont Ozempic 2 mg weekly\par - cont lifestyle changes\par - cont ophthalmology follow up\par \par 2. stable, asymptomatic multiple thyroid nodules, benign FNA in past - repeat thyroid sono summer 2023\par \par 3. HLD - controlled, cont statin, \par \par 4. Overweight -weight gain due to diet, cont Ozempic, watch diet

## 2023-04-05 NOTE — PHYSICAL EXAM
[Alert] : alert [No Acute Distress] : no acute distress [No LAD] : no lymphadenopathy [Normal S1, S2] : normal S1 and S2 [Normal Rate] : heart rate was normal [No Edema] : no peripheral edema [Not Tender] : non-tender [Soft] : abdomen soft [Oriented x3] : oriented to person, place, and time [Normal Affect] : the affect was normal [Normal Insight/Judgement] : insight and judgment were intact [Normal Mood] : the mood was normal [de-identified] : Left thyroid nodule

## 2023-04-05 NOTE — DATA REVIEWED
[FreeTextEntry1] : Thyroid FNA 2017 LMP and LLP nodules - benign follicular nodules\par \par Thyroid sono 5/2018 enlarged thyroid with bilateral thyroid nodules -  minimal changes\par \par Thyroid sono 5/9/19 normal thyroid size, mildly heterogenous, multiple thyroid nodules stable/decreased in size\par RLP nodule 9x8x5 mm\par RMP nodule 8x4x4 mm\par LMP nodule 10x9x9 mm\par LMP nodule 8x6x5 mm\par LMP nodule 81b96x6 mm\par \par Thyroid sono 6/17/20\par RMp nodule 7x3x5 mm - hypoechoic, stable\par RLP nodule 10x6x9 mm - heterogeneous, stable\par LMP nodule 10x7x8 mm heterogeneous, stable, benign FNA in 2017\par LLP nodule 99x87g64 mm heterogeneous nodule, w internal vascularity, stable. Benign FNA in 2017\par LMLP nodule 6x4x7 mm - hypoechoic, stable\par \par Thyroid sonogram 7/8/21\par RMP bilobed colloid cyst 4x4x3 mm \par RLP posterior hypoechoic nodule 9x6x8 mm\par RLP anterior colloid cyst 5x5x5 mm\par LUMP hypoechoic nodule 6x9x8 mm -stable\par LUMP hypoechoic nodule 8x6x6 mm - new\par LLP hypoechoic nodule [id number] mm - stable but increased calcifications, radiology rec FNA biopsy\par \par Thyroid FNA biopsy 8/9/21\par Left lower pole thyroid nodules - benign follicular nodule, consistent with nodular goiter and posthemorrhagic cystic change. cat 2\par \par Thyriod sonogram 7/2022 stable nodules\par ==================================================\par Labs 1/2019\par TSH 0.73\par A1c 6.6%\par urine microalb/Cr 5\par \par Labs 5/16/19\par LDL 50, HDL 67, Tg 150\par A1c 6.5%\par \par Labs 12/10/19\par Gluc 168, A1c 7%\par Cr 0.70, GFR 94\par urine microalb/Cr 3\par LDL 56, HDl 81, tg 132\par \par Labs 6/1/20 \par Gluc 160, A1c 7%\par Cr 0.72, GFR 91\par urine microalb/Cr 8\par LDL 64, HDL 73, Tg 148\par TSH 0.86\par B12 777\par \par Labs 10/26/20\par LDL 62, HDL 73, Tg 164\par Gluc 153, A1c 7.2\par Cr 0.72, GFR 90\par \par Labs 2/10/21\par urine alb/Cr 3\par Tg 150, HDL 73, LDL 45\par Gluc 154, A1c 7.2\par Cr 0.67, GFR 95\par TSH 0.82\par B12 602\par \par Labs 6/1/21\par LDL 46, HDL 65, Tg 111\par Gluc 159, A1c 7.2\par Cr 0.65, GFR 96\par \par Labs 9/7/21\par Gluc 112, A1c 6%\par Cr 0.61, GFR 97\par LDL 45, HDL 56, Tg 122\par \par Labs 12/2/21 \par LDL 45, HDL 70, Tg 115\par Gluc 141, A1c 5.7\par Cr 0.63, GFR 96\par \par Labs 3/8/22\par urine alb/Cr neg\par LDL 66, HDL 76, Tg 124\par Gluc 142, A1c 6.3\par TSH 1.22\par B12 762\par CBC wnl\par \par Labs 6/13/22\par LDL 71, HDL 77, Tg 87\par Gluc 136, A1c 6.2\par Cr 0.63, GFR 96\par \par Labs 9/14/22\par LDL 51, HDL 82, Tg 120\par Gluc 117, A1c 6%\par Cr 0.66, GFR 99\par \par Labs 1/4/23 TSH 0.73\par \par Labs 3/28/23\par Gluc 139, A1c 6%\par Cr 0.69, GFR 97\par urine alb/cr neg\par Trig 118, LDL 50, HDL 79

## 2023-04-05 NOTE — HISTORY OF PRESENT ILLNESS
[FreeTextEntry1] : Interval Hx: has vertigo and now on meclizine\par \par 1. T2DM dx 2009 on routine blood test. improved w meds, has been on/off diet\par Past meds: Farxiga caused yeast infections. MFN ER caused GI distress\par Current DM meds: Ozempic 2 mg weekly - tolerating\par SMBG: not testing\par \par Complications:\par 11/2022 eye exam mild NPDR\par (+) neuropathy ?diabetes ?radiculopathy - following with neuro, on gabapentin and amitryptilline\par 2023 neg urine microalb/cr\par \par 2. Thyroid nodules - no thyroid meds, denies anterior neck pain, dysphagia. (+) chronic voice changes due to reflux\par 3. Hyperlipidemia - on rosuvastatin 10 mg daily\par

## 2023-05-18 ENCOUNTER — RX RENEWAL (OUTPATIENT)
Age: 63
End: 2023-05-18

## 2023-07-17 ENCOUNTER — LABORATORY RESULT (OUTPATIENT)
Age: 63
End: 2023-07-17

## 2023-07-17 ENCOUNTER — APPOINTMENT (OUTPATIENT)
Dept: FAMILY MEDICINE | Facility: CLINIC | Age: 63
End: 2023-07-17
Payer: COMMERCIAL

## 2023-07-17 VITALS
WEIGHT: 161 LBS | TEMPERATURE: 97.9 F | HEART RATE: 95 BPM | HEIGHT: 66 IN | RESPIRATION RATE: 15 BRPM | SYSTOLIC BLOOD PRESSURE: 126 MMHG | BODY MASS INDEX: 25.88 KG/M2 | DIASTOLIC BLOOD PRESSURE: 70 MMHG | OXYGEN SATURATION: 98 %

## 2023-07-17 DIAGNOSIS — M54.30 SCIATICA, UNSPECIFIED SIDE: ICD-10-CM

## 2023-07-17 DIAGNOSIS — Z00.00 ENCOUNTER FOR GENERAL ADULT MEDICAL EXAMINATION W/OUT ABNORMAL FINDINGS: ICD-10-CM

## 2023-07-17 DIAGNOSIS — M79.2 NEURALGIA AND NEURITIS, UNSPECIFIED: ICD-10-CM

## 2023-07-17 PROCEDURE — 99214 OFFICE O/P EST MOD 30 MIN: CPT

## 2023-07-17 NOTE — ASSESSMENT
[FreeTextEntry1] : 64 yo F with PMHx of RADHA, T2DM, HLD presents for a follow up. patient states she have multiple pruritic bites over her body that developed after she woke up from sleep. she denies any other symptoms, no fever, N/V, no recent travels or staying anywhere new\par \par care plan reviewed \par Medications ordered \par - betamethasone cream for itch \par \par lyme panel ordered\par referral for acupuncture for her sciatica

## 2023-07-17 NOTE — HISTORY OF PRESENT ILLNESS
[FreeTextEntry1] : follow up  [de-identified] : 64 yo F with PMHx of RADHA, T2DM, HLD presents for a follow up. patient states she have multiple pruritic bites over her body that developed after she woke up from sleep. she denies any other symptoms, no fever, N/V, no recent travels or staying anywhere new.

## 2023-07-18 LAB
ALBUMIN SERPL ELPH-MCNC: 4.3 G/DL
ALP BLD-CCNC: 73 U/L
ALT SERPL-CCNC: 16 U/L
ANION GAP SERPL CALC-SCNC: 13 MMOL/L
AST SERPL-CCNC: 27 U/L
BILIRUB SERPL-MCNC: 0.5 MG/DL
BUN SERPL-MCNC: 13 MG/DL
CALCIUM SERPL-MCNC: 9.3 MG/DL
CHLORIDE SERPL-SCNC: 102 MMOL/L
CHOLEST SERPL-MCNC: 138 MG/DL
CO2 SERPL-SCNC: 22 MMOL/L
CREAT SERPL-MCNC: 0.7 MG/DL
EGFR: 97 ML/MIN/1.73M2
GLUCOSE SERPL-MCNC: 249 MG/DL
HDLC SERPL-MCNC: 72 MG/DL
LDLC SERPL CALC-MCNC: 46 MG/DL
NONHDLC SERPL-MCNC: 66 MG/DL
POTASSIUM SERPL-SCNC: 4.4 MMOL/L
PROT SERPL-MCNC: 6.4 G/DL
SODIUM SERPL-SCNC: 138 MMOL/L
TRIGL SERPL-MCNC: 111 MG/DL

## 2023-07-24 LAB
ANAPLASMA PHAGOCYTOPHILIA IGG ANTIBODIES: NEGATIVE
ANAPLASMA PHAGOCYTOPHILIA IGM ANTIBODIES: NEGATIVE
BABESIA ANTIBODIES, IGG: NORMAL
BABESIA ANTIBODIES, IGM: NORMAL
EHRLICIA CHAFFEENIS IGG ANTIBODIES: NEGATIVE
EHRLICIA CHAFFEENIS IGM ANTIBODIES: NEGATIVE
R RICKETTSI IGG CSF-ACNC: NEGATIVE
R RICKETTSI IGM CSF-ACNC: 0.88 INDEX

## 2023-07-27 ENCOUNTER — NON-APPOINTMENT (OUTPATIENT)
Age: 63
End: 2023-07-27

## 2023-07-27 RX ORDER — SEMAGLUTIDE 2.68 MG/ML
8 INJECTION, SOLUTION SUBCUTANEOUS
Qty: 3 | Refills: 2 | Status: COMPLETED | COMMUNITY
Start: 2021-06-03 | End: 2023-07-27

## 2023-10-03 LAB
HBA1C MFR BLD HPLC: 5.9
LDLC SERPL DIRECT ASSAY-MCNC: 72

## 2023-10-04 ENCOUNTER — APPOINTMENT (OUTPATIENT)
Dept: ENDOCRINOLOGY | Facility: CLINIC | Age: 63
End: 2023-10-04
Payer: COMMERCIAL

## 2023-10-04 VITALS
BODY MASS INDEX: 25.07 KG/M2 | OXYGEN SATURATION: 97 % | HEART RATE: 84 BPM | SYSTOLIC BLOOD PRESSURE: 122 MMHG | WEIGHT: 156 LBS | HEIGHT: 66 IN | DIASTOLIC BLOOD PRESSURE: 74 MMHG

## 2023-10-04 LAB — GLUCOSE BLDC GLUCOMTR-MCNC: 236

## 2023-10-04 PROCEDURE — 82962 GLUCOSE BLOOD TEST: CPT

## 2023-10-04 PROCEDURE — 99214 OFFICE O/P EST MOD 30 MIN: CPT | Mod: 25

## 2023-10-04 RX ORDER — MECLIZINE HYDROCHLORIDE 12.5 MG/1
12.5 TABLET ORAL
Refills: 0 | Status: DISCONTINUED | COMMUNITY
End: 2023-10-04

## 2023-10-04 RX ORDER — CLARITHROMYCIN 500 MG/1
500 TABLET, FILM COATED ORAL
Qty: 20 | Refills: 1 | Status: DISCONTINUED | COMMUNITY
Start: 2020-09-28 | End: 2023-10-04

## 2023-11-28 ENCOUNTER — OFFICE (OUTPATIENT)
Dept: URBAN - METROPOLITAN AREA CLINIC 115 | Facility: CLINIC | Age: 63
Setting detail: OPHTHALMOLOGY
End: 2023-11-28
Payer: COMMERCIAL

## 2023-11-28 DIAGNOSIS — H25.13: ICD-10-CM

## 2023-11-28 DIAGNOSIS — E11.3293: ICD-10-CM

## 2023-11-28 DIAGNOSIS — H40.033: ICD-10-CM

## 2023-11-28 DIAGNOSIS — Z79.84: ICD-10-CM

## 2023-11-28 PROCEDURE — 92134 CPTRZ OPH DX IMG PST SGM RTA: CPT | Performed by: OPHTHALMOLOGY

## 2023-11-28 PROCEDURE — 92014 COMPRE OPH EXAM EST PT 1/>: CPT | Performed by: OPHTHALMOLOGY

## 2023-11-28 ASSESSMENT — REFRACTION_AUTOREFRACTION
OD_AXIS: 036
OD_SPHERE: +1.25
OD_CYLINDER: -0.75
OS_CYLINDER: -1.25
OS_AXIS: 152
OS_SPHERE: +1.75

## 2023-11-28 ASSESSMENT — REFRACTION_MANIFEST
OD_CYLINDER: -1.00
OD_AXIS: 008
OS_SPHERE: +1.50
OS_VA1: 20/20-1
OU_VA: 20/20-
OS_CYLINDER: -1.50
OD_ADD: +2.50
OS_ADD: +2.50
OD_ADD: +2.50
OS_CYLINDER: -1.25
OS_AXIS: 158
OS_AXIS: 150
OD_SPHERE: +1.25
OS_ADD: +2.50
OD_CYLINDER: -1.25
OD_SPHERE: +1.75
OS_VA1: 20/20-
OS_SPHERE: +2.00
OD_VA1: 20/20-
OD_AXIS: 030
OD_VA1: 20/25-1

## 2023-11-28 ASSESSMENT — SPHEQUIV_DERIVED
OD_SPHEQUIV: 0.625
OS_SPHEQUIV: 1.125
OS_SPHEQUIV: 0.75
OD_SPHEQUIV: 0.875
OD_SPHEQUIV: 1.25
OS_SPHEQUIV: 1.375

## 2023-11-28 ASSESSMENT — REFRACTION_CURRENTRX
OD_AXIS: 008
OS_SPHERE: +1.50
OD_VPRISM_DIRECTION: PROGS
OD_SPHERE: +1.00
OD_ADD: +2.50
OS_CYLINDER: -1.25
OS_AXIS: 150
OS_VPRISM_DIRECTION: PROGS
OS_OVR_VA: 20/
OS_ADD: +2.50
OD_OVR_VA: 20/
OD_CYLINDER: -1.00

## 2023-11-28 ASSESSMENT — CONFRONTATIONAL VISUAL FIELD TEST (CVF)
OS_FINDINGS: FULL
OD_FINDINGS: FULL

## 2024-01-11 LAB
HBA1C MFR BLD HPLC: 5.8
LDLC SERPL DIRECT ASSAY-MCNC: 63
MICROALBUMIN/CREAT 24H UR-RTO: NORMAL

## 2024-01-12 ENCOUNTER — APPOINTMENT (OUTPATIENT)
Dept: ENDOCRINOLOGY | Facility: CLINIC | Age: 64
End: 2024-01-12
Payer: COMMERCIAL

## 2024-01-12 VITALS
DIASTOLIC BLOOD PRESSURE: 72 MMHG | SYSTOLIC BLOOD PRESSURE: 116 MMHG | WEIGHT: 147 LBS | HEART RATE: 84 BPM | BODY MASS INDEX: 23.63 KG/M2 | HEIGHT: 66 IN | OXYGEN SATURATION: 96 %

## 2024-01-12 LAB — GLUCOSE BLDC GLUCOMTR-MCNC: 210

## 2024-01-12 PROCEDURE — 82962 GLUCOSE BLOOD TEST: CPT

## 2024-01-12 PROCEDURE — 99214 OFFICE O/P EST MOD 30 MIN: CPT | Mod: 25

## 2024-01-12 NOTE — ASSESSMENT
[FreeTextEntry1] : 63 yr old female with: 1. T2DM with mild NPDR - not testing, A1c5.8%, tolerating GLP1 agonist - cont Mounjaro 7.5 mg weekly - cont lifestyle changes - cont ophthalmology follow up - cont lifestyle changes  2. stable, asymptomatic multiple thyroid nodules, benign FNA of Left LP (TIRAD 5) nodule 2x in past - repeat thyroid sono summer 2024  3. HLD - controlled, cont statin,  4. Overweight - losing weight with mounjaro, cont current dose along with lifestyle changes.

## 2024-01-12 NOTE — DATA REVIEWED
[FreeTextEntry1] : Thyroid FNA 2017 LMP and LLP nodules - benign follicular nodules  Thyroid sono 5/2018 enlarged thyroid with bilateral thyroid nodules -  minimal changes  Thyroid sono 5/9/19 normal thyroid size, mildly heterogenous, multiple thyroid nodules stable/decreased in size RLP nodule 9x8x5 mm RMP nodule 8x4x4 mm LMP nodule 10x9x9 mm LMP nodule 8x6x5 mm LMP nodule [id number] mm  Thyroid sono 6/17/20 RMp nodule 7x3x5 mm - hypoechoic, stable RLP nodule 10x6x9 mm - heterogeneous, stable LMP nodule 10x7x8 mm heterogeneous, stable, benign FNA in 2017 LLP nodule [id number] mm heterogeneous nodule, w internal vascularity, stable. Benign FNA in 2017 LMLP nodule 6x4x7 mm - hypoechoic, stable  Thyroid sonogram 7/8/21 RMP bilobed colloid cyst 4x4x3 mm  RLP posterior hypoechoic nodule 9x6x8 mm RLP anterior colloid cyst 5x5x5 mm LUMP hypoechoic nodule 6x9x8 mm -stable LUMP hypoechoic nodule 8x6x6 mm - new LLP hypoechoic nodule [id number] mm - stable but increased calcifications, radiology rec FNA biopsy  Thyroid FNA biopsy 8/9/21 Left lower pole thyroid nodules - benign follicular nodule, consistent with nodular goiter and posthemorrhagic cystic change. cat 2  Thyriod sonogram 7/2022 stable nodules  Thyriod sono 8/16/23 LUP nodule 10x6x7 mm - solid, TR4 LMP nodule 6x3x7 mm - solid,. TR4 LLP nodule [id number] mm - solid, TR5 (benign FNA in 2017 and 2021) RLP nodule 9x6x7 mm - solid, TR4 ================================================== Labs 1/2019 TSH 0.73 A1c 6.6% urine microalb/Cr 5  Labs 5/16/19 LDL 50, HDL 67, Tg 150 A1c 6.5%  Labs 12/10/19 Gluc 168, A1c 7% Cr 0.70, GFR 94 urine microalb/Cr 3 LDL 56, HDl 81, tg 132  Labs 6/1/20  Gluc 160, A1c 7% Cr 0.72, GFR 91 urine microalb/Cr 8 LDL 64, HDL 73, Tg 148 TSH 0.86 B12 777  Labs 10/26/20 LDL 62, HDL 73, Tg 164 Gluc 153, A1c 7.2 Cr 0.72, GFR 90  Labs 2/10/21 urine alb/Cr 3 Tg 150, HDL 73, LDL 45 Gluc 154, A1c 7.2 Cr 0.67, GFR 95 TSH 0.82 B12 602  Labs 6/1/21 LDL 46, HDL 65, Tg 111 Gluc 159, A1c 7.2 Cr 0.65, GFR 96  Labs 9/7/21 Gluc 112, A1c 6% Cr 0.61, GFR 97 LDL 45, HDL 56, Tg 122  Labs 12/2/21  LDL 45, HDL 70, Tg 115 Gluc 141, A1c 5.7 Cr 0.63, GFR 96  Labs 3/8/22 urine alb/Cr neg LDL 66, HDL 76, Tg 124 Gluc 142, A1c 6.3 TSH 1.22 B12 762 CBC wnl  Labs 6/13/22 LDL 71, HDL 77, Tg 87 Gluc 136, A1c 6.2 Cr 0.63, GFR 96  Labs 9/14/22 LDL 51, HDL 82, Tg 120 Gluc 117, A1c 6% Cr 0.66, GFR 99  Labs 1/4/23 TSH 0.73  Labs 3/28/23 Gluc 139, A1c 6% Cr 0.69, GFR 97 urine alb/cr neg Trig 118, LDL 50, HDL 79  Labs 9/23/23 LDL 72, HDL 64, Trig 77 Gluc 144, A1c 5.9 Cr 0.66, GFR 98  Labs 1/5/24 Gluc 118, A1c 5.8 Cr 0.78, GFR 85 urine alb/cr neg Trig 73, HDL 72, LDL 63 TSH 1.02

## 2024-01-12 NOTE — HISTORY OF PRESENT ILLNESS
[FreeTextEntry1] : Interval Hx: doing well with Mounjaro, tolerating it well and losing weight  1. T2DM dx 2009 on routine blood test. improved w meds, has been on/off diet Past meds: Farxiga caused yeast infections. MFN ER caused GI distress Current DM meds: Mounjaro 7.5 mg weekly SMBG: not testing  Complications:  eye exam ?mild NPDR (+) neuropathy ?diabetes ?radiculopathy - following with neuro, on gabapentin and amitryptilline 2024 neg urine microalb/cr  2. Thyroid nodules - no thyroid meds, denies anterior neck pain, dysphagia. (+) chronic voice changes due to reflux 3. Hyperlipidemia - on rosuvastatin 10 mg daily

## 2024-01-12 NOTE — PHYSICAL EXAM
[Alert] : alert [No Acute Distress] : no acute distress [EOMI] : extra ocular movement intact [Thyroid Not Enlarged] : the thyroid was not enlarged [No Respiratory Distress] : no respiratory distress [Clear to Auscultation] : lungs were clear to auscultation bilaterally [Normal S1, S2] : normal S1 and S2 [Normal Rate] : heart rate was normal [No Edema] : no peripheral edema [Not Tender] : non-tender [Soft] : abdomen soft [2+] : 2+ in the dorsalis pedis [No Tremors] : no tremors [Oriented x3] : oriented to person, place, and time [Normal Affect] : the affect was normal [Normal Insight/Judgement] : insight and judgment were intact [Normal Mood] : the mood was normal [Vibration Dec.] : normal vibratory sensation at the level of the toes [Diminished Throughout Both Feet] : normal tactile sensation with monofilament testing throughout both feet [Acanthosis Nigricans] : no acanthosis nigricans [de-identified] : left thyroid nodule

## 2024-03-04 ENCOUNTER — RX RENEWAL (OUTPATIENT)
Age: 64
End: 2024-03-04

## 2024-04-02 ENCOUNTER — RX RENEWAL (OUTPATIENT)
Age: 64
End: 2024-04-02

## 2024-05-30 ENCOUNTER — LABORATORY RESULT (OUTPATIENT)
Age: 64
End: 2024-05-30

## 2024-05-30 ENCOUNTER — APPOINTMENT (OUTPATIENT)
Dept: FAMILY MEDICINE | Facility: CLINIC | Age: 64
End: 2024-05-30
Payer: COMMERCIAL

## 2024-05-30 VITALS
RESPIRATION RATE: 12 BRPM | BODY MASS INDEX: 22.5 KG/M2 | DIASTOLIC BLOOD PRESSURE: 70 MMHG | TEMPERATURE: 97.2 F | SYSTOLIC BLOOD PRESSURE: 122 MMHG | HEART RATE: 78 BPM | HEIGHT: 66 IN | WEIGHT: 140 LBS | OXYGEN SATURATION: 97 %
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
## 2024-05-30 DIAGNOSIS — F41.1 GENERALIZED ANXIETY DISORDER: ICD-10-CM

## 2024-05-30 DIAGNOSIS — Z87.898 PERSONAL HISTORY OF OTHER SPECIFIED CONDITIONS: ICD-10-CM

## 2024-05-30 DIAGNOSIS — K21.9 GASTRO-ESOPHAGEAL REFLUX DISEASE W/OUT ESOPHAGITIS: ICD-10-CM

## 2024-05-30 DIAGNOSIS — T78.40XA ALLERGY, UNSPECIFIED, INITIAL ENCOUNTER: ICD-10-CM

## 2024-05-30 DIAGNOSIS — R25.2 CRAMP AND SPASM: ICD-10-CM

## 2024-05-30 DIAGNOSIS — Z00.00 ENCOUNTER FOR GENERAL ADULT MEDICAL EXAMINATION W/OUT ABNORMAL FINDINGS: ICD-10-CM

## 2024-05-30 DIAGNOSIS — L85.3 XEROSIS CUTIS: ICD-10-CM

## 2024-05-30 PROCEDURE — 36415 COLL VENOUS BLD VENIPUNCTURE: CPT

## 2024-05-30 PROCEDURE — 99396 PREV VISIT EST AGE 40-64: CPT

## 2024-05-30 PROCEDURE — 93000 ELECTROCARDIOGRAM COMPLETE: CPT

## 2024-05-30 PROCEDURE — 83036 HEMOGLOBIN GLYCOSYLATED A1C: CPT | Mod: QW

## 2024-05-30 RX ORDER — SEMAGLUTIDE 0.68 MG/ML
2 INJECTION, SOLUTION SUBCUTANEOUS
Qty: 3 | Refills: 0 | Status: DISCONTINUED | COMMUNITY
Start: 2024-03-07 | End: 2024-05-30

## 2024-05-30 RX ORDER — BETAMETHASONE DIPROPIONATE 0.5 MG/G
0.05 OINTMENT TOPICAL DAILY
Qty: 1 | Refills: 0 | Status: DISCONTINUED | COMMUNITY
Start: 2023-07-17 | End: 2024-05-30

## 2024-05-30 RX ORDER — AMITRIPTYLINE HYDROCHLORIDE 25 MG/1
25 TABLET, FILM COATED ORAL
Refills: 0 | Status: DISCONTINUED | COMMUNITY
End: 2024-05-30

## 2024-05-30 RX ORDER — FLUTICASONE PROPIONATE 50 UG/1
50 SPRAY, METERED NASAL TWICE DAILY
Qty: 1 | Refills: 5 | Status: ACTIVE | COMMUNITY
Start: 2020-09-28 | End: 1900-01-01

## 2024-05-30 RX ORDER — PANTOPRAZOLE 40 MG/1
40 TABLET, DELAYED RELEASE ORAL
Qty: 90 | Refills: 0 | Status: ACTIVE | COMMUNITY
Start: 2022-04-14

## 2024-05-30 RX ORDER — AZELASTINE HYDROCHLORIDE 137 UG/1
0.1 SPRAY, METERED NASAL
Qty: 1 | Refills: 0 | Status: ACTIVE | COMMUNITY
Start: 2022-04-05

## 2024-05-30 RX ORDER — HYOSCYAMINE SULFATE 0.12 MG/1
0.12 TABLET ORAL
Qty: 90 | Refills: 0 | Status: ACTIVE | COMMUNITY

## 2024-05-30 NOTE — HISTORY OF PRESENT ILLNESS
[FreeTextEntry1] : CPE 62 yo female with PMHx of anxiety, T2DM, HLD presents for comprehensive physical.  [de-identified] : CPE 64 yo female with PMHx of anxiety, T2DM, HLD presents for comprehensive physical. Patient states she has been having bilateral calf cramps every night x 2 weeks. 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 Otherwise, patient has no complaints or concerns. Patient denies recent illness or hospitalizations. Patient denies fever, chills, CP, SOB, nausea, vomiting, diarrhea, numbness, tingling, weakness.

## 2024-05-30 NOTE — ASSESSMENT
[FreeTextEntry1] : CPE 62 yo female with PMHx of anxiety, T2DM, HLD presents for comprehensive physical. Patient states she has been having bilateral calf cramps every night x 2 weeks. 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 Otherwise, patient has no complaints or concerns. Patient denies recent illness or hospitalizations. Patient denies fever, chills, CP, SOB, nausea, vomiting, diarrhea, numbness, tingling, weakness.   care plan reviewed labs drawn  medications reviewed and renewed RTC in 3 months

## 2024-05-30 NOTE — HEALTH RISK ASSESSMENT
[Never] : Never [Good] : ~his/her~  mood as  good [No] : No [Never (0 pts)] : Never (0 points) [0] : 2) Feeling down, depressed, or hopeless: Not at all (0) [PHQ-2 Negative - No further assessment needed] : PHQ-2 Negative - No further assessment needed [Patient reported mammogram was normal] : Patient reported mammogram was normal [Patient reported PAP Smear was normal] : Patient reported PAP Smear was normal [Patient reported colonoscopy was normal] : Patient reported colonoscopy was normal [HIV Test offered] : HIV Test offered [Hepatitis C test offered] : Hepatitis C test offered [With Significant Other] : lives with significant other [Employed] : employed [] :  [Fully functional (bathing, dressing, toileting, transferring, walking, feeding)] : Fully functional (bathing, dressing, toileting, transferring, walking, feeding) [Fully functional (using the telephone, shopping, preparing meals, housekeeping, doing laundry, using] : Fully functional and needs no help or supervision to perform IADLs (using the telephone, shopping, preparing meals, housekeeping, doing laundry, using transportation, managing medications and managing finances) [Reports normal functional visual acuity (ie: able to read med bottle)] : Reports normal functional visual acuity [> 15 Years] : > 15 Years [Audit-CScore] : 0 [DYS3Ldqve] : 0 [Change in mental status noted] : No change in mental status noted [Language] : denies difficulty with language [Behavior] : denies difficulty with behavior [Learning/Retaining New Information] : denies difficulty learning/retaining new information [Handling Complex Tasks] : denies difficulty handling complex tasks [Reasoning] : denies difficulty with reasoning [Spatial Ability and Orientation] : denies difficulty with spatial ability and orientation [Sexually Active] : not sexually active [High Risk Behavior] : no high risk behavior [Reports changes in hearing] : Reports no changes in hearing [Reports changes in vision] : Reports no changes in vision [Reports changes in dental health] : Reports no changes in dental health [MammogramDate] : 10/2023 [PapSmearDate] : 07/2023 [ColonoscopyDate] : 2019 [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

## 2024-05-30 NOTE — PLAN
[FreeTextEntry1] : CPE 64 yo female with PMHx of anxiety, T2DM, HLD presents for comprehensive physical. Patient states she has been having bilateral calf cramps every night x 2 weeks. 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 Otherwise, patient has no complaints or concerns. Patient denies recent illness or hospitalizations. Patient denies fever, chills, CP, SOB, nausea, vomiting, diarrhea, numbness, tingling, weakness.   care plan reviewed labs drawn  medications reviewed and renewed RTC in 3 months

## 2024-05-31 LAB
ALBUMIN SERPL ELPH-MCNC: 4.5 G/DL
ALP BLD-CCNC: 68 U/L
ALT SERPL-CCNC: 9 U/L
ANION GAP SERPL CALC-SCNC: 14 MMOL/L
APPEARANCE: CLEAR
AST SERPL-CCNC: 22 U/L
BASOPHILS # BLD AUTO: 0.04 K/UL
BASOPHILS NFR BLD AUTO: 0.5 %
BILIRUB SERPL-MCNC: 0.5 MG/DL
BILIRUBIN URINE: NEGATIVE
BLOOD URINE: NEGATIVE
BUN SERPL-MCNC: 20 MG/DL
CALCIUM SERPL-MCNC: 9.8 MG/DL
CHLORIDE SERPL-SCNC: 104 MMOL/L
CHOLEST SERPL-MCNC: 156 MG/DL
CO2 SERPL-SCNC: 25 MMOL/L
COLOR: YELLOW
CREAT SERPL-MCNC: 0.77 MG/DL
EGFR: 86 ML/MIN/1.73M2
EOSINOPHIL # BLD AUTO: 0.18 K/UL
EOSINOPHIL NFR BLD AUTO: 2.3 %
GLUCOSE QUALITATIVE U: NEGATIVE MG/DL
GLUCOSE SERPL-MCNC: 123 MG/DL
HCT VFR BLD CALC: 46 %
HDLC SERPL-MCNC: 79 MG/DL
HGB BLD-MCNC: 14.6 G/DL
IMM GRANULOCYTES NFR BLD AUTO: 0.1 %
KETONES URINE: NEGATIVE MG/DL
LDLC SERPL CALC-MCNC: 63 MG/DL
LEUKOCYTE ESTERASE URINE: ABNORMAL
LYMPHOCYTES # BLD AUTO: 1.2 K/UL
LYMPHOCYTES NFR BLD AUTO: 15.1 %
MAGNESIUM SERPL-MCNC: 2.2 MG/DL
MAN DIFF?: NORMAL
MCHC RBC-ENTMCNC: 29.4 PG
MCHC RBC-ENTMCNC: 31.7 GM/DL
MCV RBC AUTO: 92.7 FL
MONOCYTES # BLD AUTO: 0.43 K/UL
MONOCYTES NFR BLD AUTO: 5.4 %
NEUTROPHILS # BLD AUTO: 6.08 K/UL
NEUTROPHILS NFR BLD AUTO: 76.6 %
NITRITE URINE: NEGATIVE
NONHDLC SERPL-MCNC: 77 MG/DL
PH URINE: 6.5
PLATELET # BLD AUTO: 262 K/UL
POTASSIUM SERPL-SCNC: 5.1 MMOL/L
PROT SERPL-MCNC: 6.4 G/DL
PROTEIN URINE: NEGATIVE MG/DL
RBC # BLD: 4.96 M/UL
RBC # FLD: 12.2 %
SODIUM SERPL-SCNC: 142 MMOL/L
SPECIFIC GRAVITY URINE: 1.02
TRIGL SERPL-MCNC: 72 MG/DL
TSH SERPL-ACNC: 0.69 UIU/ML
UROBILINOGEN URINE: 0.2 MG/DL
WBC # FLD AUTO: 7.94 K/UL

## 2024-06-03 LAB — 24R-OH-CALCIDIOL SERPL-MCNC: 68.6 PG/ML

## 2024-06-05 ENCOUNTER — NON-APPOINTMENT (OUTPATIENT)
Age: 64
End: 2024-06-05

## 2024-06-05 DIAGNOSIS — N39.0 URINARY TRACT INFECTION, SITE NOT SPECIFIED: ICD-10-CM

## 2024-06-05 RX ORDER — CIPROFLOXACIN HYDROCHLORIDE 500 MG/1
500 TABLET, FILM COATED ORAL TWICE DAILY
Qty: 20 | Refills: 0 | Status: ACTIVE | COMMUNITY
Start: 2024-06-05 | End: 1900-01-01

## 2024-06-17 LAB
HBA1C MFR BLD HPLC: 6
LDLC SERPL DIRECT ASSAY-MCNC: 101

## 2024-06-18 ENCOUNTER — APPOINTMENT (OUTPATIENT)
Dept: ENDOCRINOLOGY | Facility: CLINIC | Age: 64
End: 2024-06-18
Payer: COMMERCIAL

## 2024-06-18 VITALS
BODY MASS INDEX: 22.18 KG/M2 | WEIGHT: 138 LBS | DIASTOLIC BLOOD PRESSURE: 82 MMHG | SYSTOLIC BLOOD PRESSURE: 122 MMHG | HEART RATE: 87 BPM | HEIGHT: 66 IN | OXYGEN SATURATION: 96 %

## 2024-06-18 DIAGNOSIS — E04.2 NONTOXIC MULTINODULAR GOITER: ICD-10-CM

## 2024-06-18 DIAGNOSIS — E11.65 TYPE 2 DIABETES MELLITUS WITH HYPERGLYCEMIA: ICD-10-CM

## 2024-06-18 DIAGNOSIS — E78.5 HYPERLIPIDEMIA, UNSPECIFIED: ICD-10-CM

## 2024-06-18 LAB — GLUCOSE BLDC GLUCOMTR-MCNC: 102

## 2024-06-18 PROCEDURE — 82962 GLUCOSE BLOOD TEST: CPT

## 2024-06-18 PROCEDURE — 99214 OFFICE O/P EST MOD 30 MIN: CPT

## 2024-06-18 RX ORDER — TIRZEPATIDE 5 MG/.5ML
5 INJECTION, SOLUTION SUBCUTANEOUS
Qty: 3 | Refills: 1 | Status: ACTIVE | COMMUNITY
Start: 2023-07-27 | End: 1900-01-01

## 2024-06-18 NOTE — PHYSICAL EXAM
[Alert] : alert [No Acute Distress] : no acute distress [EOMI] : extra ocular movement intact [Thyroid Not Enlarged] : the thyroid was not enlarged [No Respiratory Distress] : no respiratory distress [Clear to Auscultation] : lungs were clear to auscultation bilaterally [Normal S1, S2] : normal S1 and S2 [Normal Rate] : heart rate was normal [No Edema] : no peripheral edema [Not Tender] : non-tender [Soft] : abdomen soft [No Tremors] : no tremors [Oriented x3] : oriented to person, place, and time [Normal Affect] : the affect was normal [Normal Insight/Judgement] : insight and judgment were intact [Normal Mood] : the mood was normal [Acanthosis Nigricans] : no acanthosis nigricans [de-identified] : left thyroid nodule

## 2024-06-18 NOTE — HISTORY OF PRESENT ILLNESS
[FreeTextEntry1] : Interval Hx: doing well with Mounjaro but switching insurance and may need to switch to Ozempic later this year; stopped crestor due to leg cramps (PCP aware)  1. T2DM dx 2009 on routine blood test. improved w meds, has been on/off diet Past meds: Farxiga caused yeast infections. MFN ER caused GI distress Current DM meds: Mounjaro 7.5 mg weekly SMBG: not testing  Complications: 11/2023  eye exam mild NPDR (+) neuropathy ?diabetes ?radiculopathy - following with neuro, on gabapentin and amitryptilline 2024 neg urine microalb/cr  2. Thyroid nodules - no thyroid meds, denies anterior neck pain, dysphagia. (+) chronic voice changes due to reflux 3. Hyperlipidemia - off statin due to muscle cramps

## 2024-06-18 NOTE — ASSESSMENT
[FreeTextEntry1] : 63 yr old female with: 1. T2DM with mild NPDR - not testing, A1c 6%, tolerating GLP1 agonist - decresae Mounjaro to 5 mg weekly, A1c well controlled and stable, BMI now normal, try and maintain on lowest possible Mounjaro dose - cont lifestyle changes - cont ophthalmology follow up - cont lifestyle changes - repeat labs 3 months  2. stable, asymptomatic multiple thyroid nodules, benign FNA of Left LP (TIRAD 5) nodule 2x in past - repeat thyroid sono summer 2025  3. HLD - LDL chol worse, intolerant to statin, try zeta  4. Overweight - losing weight with mounjaro, BMI normal, titrate down Mounjaro

## 2024-06-18 NOTE — DATA REVIEWED
[FreeTextEntry1] : Thyroid FNA 2017 LMP and LLP nodules - benign follicular nodules  Thyroid sono 5/2018 enlarged thyroid with bilateral thyroid nodules -  minimal changes  Thyroid sono 5/9/19 normal thyroid size, mildly heterogenous, multiple thyroid nodules stable/decreased in size RLP nodule 9x8x5 mm RMP nodule 8x4x4 mm LMP nodule 10x9x9 mm LMP nodule 8x6x5 mm LMP nodule [id number] mm  Thyroid sono 6/17/20 RMp nodule 7x3x5 mm - hypoechoic, stable RLP nodule 10x6x9 mm - heterogeneous, stable LMP nodule 10x7x8 mm heterogeneous, stable, benign FNA in 2017 LLP nodule [id number] mm heterogeneous nodule, w internal vascularity, stable. Benign FNA in 2017 LMLP nodule 6x4x7 mm - hypoechoic, stable  Thyroid sonogram 7/8/21 RMP bilobed colloid cyst 4x4x3 mm  RLP posterior hypoechoic nodule 9x6x8 mm RLP anterior colloid cyst 5x5x5 mm LUMP hypoechoic nodule 6x9x8 mm -stable LUMP hypoechoic nodule 8x6x6 mm - new LLP hypoechoic nodule [id number] mm - stable but increased calcifications, radiology rec FNA biopsy  Thyroid FNA biopsy 8/9/21 Left lower pole thyroid nodules - benign follicular nodule, consistent with nodular goiter and posthemorrhagic cystic change. cat 2  Thyriod sonogram 7/2022 stable nodules  Thyriod sono 8/16/23 LUP nodule 10x6x7 mm - solid, TR4 LMP nodule 6x3x7 mm - solid,. TR4 LLP nodule [id number] mm - solid, TR5 (benign FNA in 2017 and 2021) RLP nodule 9x6x7 mm - solid, TR4  Thyroid sonogram 5/21/24 RLP nodule 9x7x6 mm - complex, TR 4 LUP nodule [id number] - complex, TR4 LMP nodule 7x6x3 mm - solid, TR4 LLP nodule [id number] mm - complesx, TR4 (benign FNA in 2017 and 2021) stable nodules ================================================== Labs 1/2019 TSH 0.73 A1c 6.6% urine microalb/Cr 5  Labs 5/16/19 LDL 50, HDL 67, Tg 150 A1c 6.5%  Labs 12/10/19 Gluc 168, A1c 7% Cr 0.70, GFR 94 urine microalb/Cr 3 LDL 56, HDl 81, tg 132  Labs 6/1/20  Gluc 160, A1c 7% Cr 0.72, GFR 91 urine microalb/Cr 8 LDL 64, HDL 73, Tg 148 TSH 0.86 B12 777  Labs 10/26/20 LDL 62, HDL 73, Tg 164 Gluc 153, A1c 7.2 Cr 0.72, GFR 90  Labs 2/10/21 urine alb/Cr 3 Tg 150, HDL 73, LDL 45 Gluc 154, A1c 7.2 Cr 0.67, GFR 95 TSH 0.82 B12 602  Labs 6/1/21 LDL 46, HDL 65, Tg 111 Gluc 159, A1c 7.2 Cr 0.65, GFR 96  Labs 9/7/21 Gluc 112, A1c 6% Cr 0.61, GFR 97 LDL 45, HDL 56, Tg 122  Labs 12/2/21  LDL 45, HDL 70, Tg 115 Gluc 141, A1c 5.7 Cr 0.63, GFR 96  Labs 3/8/22 urine alb/Cr neg LDL 66, HDL 76, Tg 124 Gluc 142, A1c 6.3 TSH 1.22 B12 762 CBC wnl  Labs 6/13/22 LDL 71, HDL 77, Tg 87 Gluc 136, A1c 6.2 Cr 0.63, GFR 96  Labs 9/14/22 LDL 51, HDL 82, Tg 120 Gluc 117, A1c 6% Cr 0.66, GFR 99  Labs 1/4/23 TSH 0.73  Labs 3/28/23 Gluc 139, A1c 6% Cr 0.69, GFR 97 urine alb/cr neg Trig 118, LDL 50, HDL 79  Labs 9/23/23 LDL 72, HDL 64, Trig 77 Gluc 144, A1c 5.9 Cr 0.66, GFR 98  Labs 1/5/24 Gluc 118, A1c 5.8 Cr 0.78, GFR 85 urine alb/cr neg Trig 73, HDL 72, LDL 63 TSH 1.02  Labs 6/12/24 , HDL 74, Trig 88 Gluc 209, A1c 6% Cr 0.71, GFR 95

## 2024-06-26 RX ORDER — EZETIMIBE 10 MG/1
10 TABLET ORAL
Qty: 90 | Refills: 1 | Status: ACTIVE | COMMUNITY
Start: 2024-06-26 | End: 1900-01-01

## 2024-07-12 ENCOUNTER — APPOINTMENT (OUTPATIENT)
Dept: ENDOCRINOLOGY | Facility: CLINIC | Age: 64
End: 2024-07-12

## 2024-08-07 PROBLEM — U07.1 COVID-19 VIRUS INFECTION: Status: ACTIVE | Noted: 2021-02-12

## 2024-10-07 ENCOUNTER — RX RENEWAL (OUTPATIENT)
Age: 64
End: 2024-10-07

## 2024-10-07 LAB
HBA1C MFR BLD HPLC: 5.7
LDLC SERPL DIRECT ASSAY-MCNC: 95

## 2024-10-08 ENCOUNTER — APPOINTMENT (OUTPATIENT)
Dept: ENDOCRINOLOGY | Facility: CLINIC | Age: 64
End: 2024-10-08
Payer: MEDICARE

## 2024-10-08 VITALS
DIASTOLIC BLOOD PRESSURE: 84 MMHG | BODY MASS INDEX: 22.66 KG/M2 | HEART RATE: 78 BPM | OXYGEN SATURATION: 99 % | SYSTOLIC BLOOD PRESSURE: 132 MMHG | HEIGHT: 66 IN | WEIGHT: 141 LBS

## 2024-10-08 DIAGNOSIS — E04.2 NONTOXIC MULTINODULAR GOITER: ICD-10-CM

## 2024-10-08 DIAGNOSIS — E11.65 TYPE 2 DIABETES MELLITUS WITH HYPERGLYCEMIA: ICD-10-CM

## 2024-10-08 DIAGNOSIS — E78.5 HYPERLIPIDEMIA, UNSPECIFIED: ICD-10-CM

## 2024-10-08 LAB — GLUCOSE BLDC GLUCOMTR-MCNC: 138

## 2024-10-08 PROCEDURE — G2211 COMPLEX E/M VISIT ADD ON: CPT

## 2024-10-08 PROCEDURE — 82962 GLUCOSE BLOOD TEST: CPT

## 2024-10-08 PROCEDURE — 99204 OFFICE O/P NEW MOD 45 MIN: CPT

## 2024-10-10 RX ORDER — TIRZEPATIDE 5 MG/.5ML
5 INJECTION, SOLUTION SUBCUTANEOUS
Qty: 3 | Refills: 1 | Status: ACTIVE | COMMUNITY
Start: 2024-10-08

## 2024-11-25 ENCOUNTER — RX RENEWAL (OUTPATIENT)
Age: 64
End: 2024-11-25

## 2024-12-16 ENCOUNTER — OFFICE (OUTPATIENT)
Dept: URBAN - METROPOLITAN AREA CLINIC 115 | Facility: CLINIC | Age: 64
Setting detail: OPHTHALMOLOGY
End: 2024-12-16
Payer: MEDICARE

## 2024-12-16 DIAGNOSIS — H25.13: ICD-10-CM

## 2024-12-16 DIAGNOSIS — E11.3293: ICD-10-CM

## 2024-12-16 DIAGNOSIS — Z79.84: ICD-10-CM

## 2024-12-16 DIAGNOSIS — H40.033: ICD-10-CM

## 2024-12-16 PROCEDURE — 92014 COMPRE OPH EXAM EST PT 1/>: CPT | Performed by: OPHTHALMOLOGY

## 2024-12-16 PROCEDURE — 92134 CPTRZ OPH DX IMG PST SGM RTA: CPT | Performed by: OPHTHALMOLOGY

## 2024-12-16 ASSESSMENT — REFRACTION_MANIFEST
OS_VA1: 20/20-
OS_AXIS: 150
OS_VA1: 20/20-1
OD_SPHERE: +1.75
OS_CYLINDER: -1.50
OD_CYLINDER: -1.00
OD_CYLINDER: -1.25
OU_VA: 20/20-
OD_VA1: 20/20-
OS_CYLINDER: -1.25
OD_AXIS: 030
OS_ADD: +2.50
OS_SPHERE: +2.00
OD_VA1: 20/25-1
OS_AXIS: 158
OD_SPHERE: +1.25
OS_ADD: +2.50
OD_ADD: +2.50
OS_SPHERE: +1.50
OD_ADD: +2.50
OD_AXIS: 008

## 2024-12-16 ASSESSMENT — TONOMETRY
OD_IOP_MMHG: 19
OS_IOP_MMHG: 21

## 2024-12-16 ASSESSMENT — VISUAL ACUITY
OS_BCVA: 20/25+2
OD_BCVA: 20/25+1

## 2024-12-16 ASSESSMENT — REFRACTION_CURRENTRX
OS_AXIS: 150
OS_CYLINDER: -1.25
OD_AXIS: 034
OS_AXIS: 148
OD_OVR_VA: 20/
OD_SPHERE: +1.00
OS_SPHERE: +2.25
OS_OVR_VA: 20/
OD_SPHERE: +2.00
OD_CYLINDER: -1.00
OD_OVR_VA: 20/
OD_CYLINDER: -1.25
OS_OVR_VA: 20/
OS_VPRISM_DIRECTION: PROGS
OS_SPHERE: +1.50
OS_ADD: +2.50
OD_VPRISM_DIRECTION: PROGS
OS_VPRISM_DIRECTION: PROGS
OD_ADD: +2.50
OS_ADD: +2.50
OD_AXIS: 008
OD_ADD: +2.50
OD_VPRISM_DIRECTION: PROGS
OS_CYLINDER: -1.50

## 2024-12-16 ASSESSMENT — CONFRONTATIONAL VISUAL FIELD TEST (CVF)
OD_FINDINGS: FULL
OS_FINDINGS: FULL

## 2024-12-16 ASSESSMENT — REFRACTION_AUTOREFRACTION
OS_SPHERE: +1.75
OD_CYLINDER: -0.50
OD_SPHERE: +1.50
OS_AXIS: 151
OS_CYLINDER: -0.50
OD_AXIS: 047

## 2024-12-20 ENCOUNTER — RX RENEWAL (OUTPATIENT)
Age: 64
End: 2024-12-20

## 2025-02-11 LAB
HBA1C MFR BLD HPLC: 5.9
LDLC SERPL DIRECT ASSAY-MCNC: 123
TSH SERPL-ACNC: 0.65

## 2025-03-17 ENCOUNTER — TRANSCRIPTION ENCOUNTER (OUTPATIENT)
Age: 65
End: 2025-03-17

## 2025-03-20 ENCOUNTER — APPOINTMENT (OUTPATIENT)
Dept: FAMILY MEDICINE | Facility: CLINIC | Age: 65
End: 2025-03-20
Payer: MEDICARE

## 2025-03-20 VITALS
DIASTOLIC BLOOD PRESSURE: 84 MMHG | HEART RATE: 87 BPM | BODY MASS INDEX: 24.11 KG/M2 | TEMPERATURE: 98.3 F | SYSTOLIC BLOOD PRESSURE: 150 MMHG | WEIGHT: 150 LBS | OXYGEN SATURATION: 98 % | RESPIRATION RATE: 15 BRPM | HEIGHT: 66 IN

## 2025-03-20 DIAGNOSIS — R09.81 NASAL CONGESTION: ICD-10-CM

## 2025-03-20 DIAGNOSIS — E11.65 TYPE 2 DIABETES MELLITUS WITH HYPERGLYCEMIA: ICD-10-CM

## 2025-03-20 DIAGNOSIS — F41.1 GENERALIZED ANXIETY DISORDER: ICD-10-CM

## 2025-03-20 DIAGNOSIS — J06.9 ACUTE UPPER RESPIRATORY INFECTION, UNSPECIFIED: ICD-10-CM

## 2025-03-20 DIAGNOSIS — R05.1 ACUTE COUGH: ICD-10-CM

## 2025-03-20 DIAGNOSIS — K21.9 GASTRO-ESOPHAGEAL REFLUX DISEASE W/OUT ESOPHAGITIS: ICD-10-CM

## 2025-03-20 DIAGNOSIS — R00.2 PALPITATIONS: ICD-10-CM

## 2025-03-20 DIAGNOSIS — E04.2 NONTOXIC MULTINODULAR GOITER: ICD-10-CM

## 2025-03-20 DIAGNOSIS — E78.5 HYPERLIPIDEMIA, UNSPECIFIED: ICD-10-CM

## 2025-03-20 PROCEDURE — G2211 COMPLEX E/M VISIT ADD ON: CPT

## 2025-03-20 PROCEDURE — 99203 OFFICE O/P NEW LOW 30 MIN: CPT

## 2025-03-21 RX ORDER — AZITHROMYCIN 250 MG/1
250 TABLET, FILM COATED ORAL
Qty: 1 | Refills: 0 | Status: ACTIVE | OUTPATIENT
Start: 2025-03-20

## 2025-03-24 LAB
INFLUENZA A RESULT: NOT DETECTED
INFLUENZA B RESULT: NOT DETECTED
RESP SYN VIRUS RESULT: NOT DETECTED
SARS-COV-2 RESULT: NOT DETECTED

## 2025-06-10 ENCOUNTER — RX RENEWAL (OUTPATIENT)
Age: 65
End: 2025-06-10

## 2025-06-17 ENCOUNTER — APPOINTMENT (OUTPATIENT)
Dept: ENDOCRINOLOGY | Facility: CLINIC | Age: 65
End: 2025-06-17
Payer: MEDICARE

## 2025-06-17 VITALS
WEIGHT: 156 LBS | OXYGEN SATURATION: 99 % | DIASTOLIC BLOOD PRESSURE: 70 MMHG | SYSTOLIC BLOOD PRESSURE: 124 MMHG | BODY MASS INDEX: 25.07 KG/M2 | HEART RATE: 76 BPM | HEIGHT: 66 IN

## 2025-06-17 PROBLEM — R63.5 WEIGHT GAIN: Status: ACTIVE | Noted: 2025-06-17

## 2025-06-17 LAB — GLUCOSE BLDC GLUCOMTR-MCNC: 197

## 2025-06-17 PROCEDURE — G2211 COMPLEX E/M VISIT ADD ON: CPT

## 2025-06-17 PROCEDURE — 82962 GLUCOSE BLOOD TEST: CPT

## 2025-06-17 PROCEDURE — 99214 OFFICE O/P EST MOD 30 MIN: CPT

## 2025-06-17 RX ORDER — ROSUVASTATIN CALCIUM 5 MG/1
5 TABLET, FILM COATED ORAL
Qty: 90 | Refills: 1 | Status: ACTIVE | COMMUNITY
Start: 2025-06-17 | End: 1900-01-01

## 2025-06-18 ENCOUNTER — APPOINTMENT (OUTPATIENT)
Dept: FAMILY MEDICINE | Facility: CLINIC | Age: 65
End: 2025-06-18
Payer: MEDICARE

## 2025-06-18 VITALS
HEART RATE: 84 BPM | OXYGEN SATURATION: 98 % | WEIGHT: 154 LBS | DIASTOLIC BLOOD PRESSURE: 76 MMHG | BODY MASS INDEX: 24.75 KG/M2 | SYSTOLIC BLOOD PRESSURE: 130 MMHG | HEIGHT: 66 IN

## 2025-06-18 PROCEDURE — 99213 OFFICE O/P EST LOW 20 MIN: CPT

## 2025-06-18 PROCEDURE — G2211 COMPLEX E/M VISIT ADD ON: CPT

## 2025-06-18 RX ORDER — DOXYCYCLINE 100 MG/1
100 CAPSULE ORAL
Qty: 14 | Refills: 0 | Status: ACTIVE | COMMUNITY
Start: 2025-06-18 | End: 1900-01-01

## 2025-06-19 LAB
INFLUENZA A RESULT: NOT DETECTED
INFLUENZA B RESULT: NOT DETECTED
RESP SYN VIRUS RESULT: NOT DETECTED
S PYO DNA THROAT QL NAA+PROBE: NOT DETECTED
SARS-COV-2 RESULT: NOT DETECTED

## 2025-09-18 LAB
HBA1C MFR BLD HPLC: 5.7
LDLC SERPL DIRECT ASSAY-MCNC: 62

## 2025-09-19 ENCOUNTER — APPOINTMENT (OUTPATIENT)
Dept: ENDOCRINOLOGY | Facility: CLINIC | Age: 65
End: 2025-09-19